# Patient Record
Sex: MALE | Race: WHITE | NOT HISPANIC OR LATINO
[De-identification: names, ages, dates, MRNs, and addresses within clinical notes are randomized per-mention and may not be internally consistent; named-entity substitution may affect disease eponyms.]

---

## 2018-11-01 ENCOUNTER — TRANSCRIPTION ENCOUNTER (OUTPATIENT)
Age: 73
End: 2018-11-01

## 2019-04-22 ENCOUNTER — TRANSCRIPTION ENCOUNTER (OUTPATIENT)
Age: 74
End: 2019-04-22

## 2019-05-08 ENCOUNTER — TRANSCRIPTION ENCOUNTER (OUTPATIENT)
Age: 74
End: 2019-05-08

## 2019-05-08 ENCOUNTER — OUTPATIENT (OUTPATIENT)
Dept: OUTPATIENT SERVICES | Facility: HOSPITAL | Age: 74
LOS: 1 days | End: 2019-05-08

## 2019-05-08 ENCOUNTER — EMERGENCY (EMERGENCY)
Facility: HOSPITAL | Age: 74
LOS: 1 days | End: 2019-05-08
Payer: MEDICARE

## 2019-05-08 PROCEDURE — 99285 EMERGENCY DEPT VISIT HI MDM: CPT

## 2019-05-08 PROCEDURE — 71045 X-RAY EXAM CHEST 1 VIEW: CPT | Mod: 26

## 2019-05-08 PROCEDURE — 93010 ELECTROCARDIOGRAM REPORT: CPT

## 2019-05-09 ENCOUNTER — OUTPATIENT (OUTPATIENT)
Dept: OUTPATIENT SERVICES | Facility: HOSPITAL | Age: 74
LOS: 1 days | End: 2019-05-09

## 2019-05-09 PROCEDURE — 93010 ELECTROCARDIOGRAM REPORT: CPT | Mod: 76

## 2019-05-11 ENCOUNTER — INPATIENT (INPATIENT)
Facility: HOSPITAL | Age: 74
LOS: 3 days | Discharge: HOME | DRG: 156 | End: 2019-05-15
Attending: HOSPITALIST | Admitting: HOSPITALIST
Payer: MEDICARE

## 2019-05-11 VITALS
SYSTOLIC BLOOD PRESSURE: 167 MMHG | RESPIRATION RATE: 18 BRPM | DIASTOLIC BLOOD PRESSURE: 89 MMHG | TEMPERATURE: 98 F | HEART RATE: 52 BPM | OXYGEN SATURATION: 96 %

## 2019-05-11 DIAGNOSIS — Z98.890 OTHER SPECIFIED POSTPROCEDURAL STATES: Chronic | ICD-10-CM

## 2019-05-11 DIAGNOSIS — I10 ESSENTIAL (PRIMARY) HYPERTENSION: ICD-10-CM

## 2019-05-11 DIAGNOSIS — Z29.9 ENCOUNTER FOR PROPHYLACTIC MEASURES, UNSPECIFIED: ICD-10-CM

## 2019-05-11 DIAGNOSIS — Z90.49 ACQUIRED ABSENCE OF OTHER SPECIFIED PARTS OF DIGESTIVE TRACT: Chronic | ICD-10-CM

## 2019-05-11 DIAGNOSIS — I49.9 CARDIAC ARRHYTHMIA, UNSPECIFIED: ICD-10-CM

## 2019-05-11 DIAGNOSIS — N40.0 BENIGN PROSTATIC HYPERPLASIA WITHOUT LOWER URINARY TRACT SYMPTOMS: ICD-10-CM

## 2019-05-11 DIAGNOSIS — I44.0 ATRIOVENTRICULAR BLOCK, FIRST DEGREE: ICD-10-CM

## 2019-05-11 DIAGNOSIS — E78.5 HYPERLIPIDEMIA, UNSPECIFIED: ICD-10-CM

## 2019-05-11 LAB
ALBUMIN SERPL ELPH-MCNC: 4.3 G/DL — SIGNIFICANT CHANGE UP (ref 3.3–5)
ALP SERPL-CCNC: 72 U/L — SIGNIFICANT CHANGE UP (ref 40–120)
ALT FLD-CCNC: 29 U/L — SIGNIFICANT CHANGE UP (ref 10–45)
ANION GAP SERPL CALC-SCNC: 12 MMOL/L — SIGNIFICANT CHANGE UP (ref 5–17)
APPEARANCE UR: CLEAR — SIGNIFICANT CHANGE UP
AST SERPL-CCNC: 18 U/L — SIGNIFICANT CHANGE UP (ref 10–40)
BASOPHILS # BLD AUTO: 0 K/UL — SIGNIFICANT CHANGE UP (ref 0–0.2)
BASOPHILS NFR BLD AUTO: 0.5 % — SIGNIFICANT CHANGE UP (ref 0–2)
BILIRUB SERPL-MCNC: 0.8 MG/DL — SIGNIFICANT CHANGE UP (ref 0.2–1.2)
BILIRUB UR-MCNC: NEGATIVE — SIGNIFICANT CHANGE UP
BUN SERPL-MCNC: 17 MG/DL — SIGNIFICANT CHANGE UP (ref 7–23)
CALCIUM SERPL-MCNC: 9.6 MG/DL — SIGNIFICANT CHANGE UP (ref 8.4–10.5)
CHLORIDE SERPL-SCNC: 103 MMOL/L — SIGNIFICANT CHANGE UP (ref 96–108)
CO2 SERPL-SCNC: 24 MMOL/L — SIGNIFICANT CHANGE UP (ref 22–31)
COLOR SPEC: SIGNIFICANT CHANGE UP
CREAT SERPL-MCNC: 1.08 MG/DL — SIGNIFICANT CHANGE UP (ref 0.5–1.3)
DIFF PNL FLD: NEGATIVE — SIGNIFICANT CHANGE UP
EOSINOPHIL # BLD AUTO: 0.1 K/UL — SIGNIFICANT CHANGE UP (ref 0–0.5)
EOSINOPHIL NFR BLD AUTO: 1.8 % — SIGNIFICANT CHANGE UP (ref 0–6)
GLUCOSE SERPL-MCNC: 88 MG/DL — SIGNIFICANT CHANGE UP (ref 70–99)
GLUCOSE UR QL: NEGATIVE — SIGNIFICANT CHANGE UP
HBA1C BLD-MCNC: 5.4 % — SIGNIFICANT CHANGE UP (ref 4–5.6)
HCT VFR BLD CALC: 45.8 % — SIGNIFICANT CHANGE UP (ref 39–50)
HGB BLD-MCNC: 16.7 G/DL — SIGNIFICANT CHANGE UP (ref 13–17)
INR BLD: 1.03 RATIO — SIGNIFICANT CHANGE UP (ref 0.88–1.16)
KETONES UR-MCNC: NEGATIVE — SIGNIFICANT CHANGE UP
LEUKOCYTE ESTERASE UR-ACNC: NEGATIVE — SIGNIFICANT CHANGE UP
LYMPHOCYTES # BLD AUTO: 2.9 K/UL — SIGNIFICANT CHANGE UP (ref 1–3.3)
LYMPHOCYTES # BLD AUTO: 37.7 % — SIGNIFICANT CHANGE UP (ref 13–44)
MAGNESIUM SERPL-MCNC: 2.1 MG/DL — SIGNIFICANT CHANGE UP (ref 1.6–2.6)
MCHC RBC-ENTMCNC: 33.8 PG — SIGNIFICANT CHANGE UP (ref 27–34)
MCHC RBC-ENTMCNC: 36.4 GM/DL — HIGH (ref 32–36)
MCV RBC AUTO: 92.8 FL — SIGNIFICANT CHANGE UP (ref 80–100)
MONOCYTES # BLD AUTO: 0.5 K/UL — SIGNIFICANT CHANGE UP (ref 0–0.9)
MONOCYTES NFR BLD AUTO: 6.8 % — SIGNIFICANT CHANGE UP (ref 2–14)
NEUTROPHILS # BLD AUTO: 4.1 K/UL — SIGNIFICANT CHANGE UP (ref 1.8–7.4)
NEUTROPHILS NFR BLD AUTO: 53.3 % — SIGNIFICANT CHANGE UP (ref 43–77)
NITRITE UR-MCNC: NEGATIVE — SIGNIFICANT CHANGE UP
PH UR: 6 — SIGNIFICANT CHANGE UP (ref 5–8)
PHOSPHATE SERPL-MCNC: 2.4 MG/DL — LOW (ref 2.5–4.5)
PLATELET # BLD AUTO: 141 K/UL — LOW (ref 150–400)
POTASSIUM SERPL-MCNC: 3.9 MMOL/L — SIGNIFICANT CHANGE UP (ref 3.5–5.3)
POTASSIUM SERPL-SCNC: 3.9 MMOL/L — SIGNIFICANT CHANGE UP (ref 3.5–5.3)
PROT SERPL-MCNC: 7 G/DL — SIGNIFICANT CHANGE UP (ref 6–8.3)
PROT UR-MCNC: NEGATIVE — SIGNIFICANT CHANGE UP
PROTHROM AB SERPL-ACNC: 11.8 SEC — SIGNIFICANT CHANGE UP (ref 10–12.9)
RBC # BLD: 4.93 M/UL — SIGNIFICANT CHANGE UP (ref 4.2–5.8)
RBC # FLD: 12.3 % — SIGNIFICANT CHANGE UP (ref 10.3–14.5)
SODIUM SERPL-SCNC: 139 MMOL/L — SIGNIFICANT CHANGE UP (ref 135–145)
SP GR SPEC: 1.02 — SIGNIFICANT CHANGE UP (ref 1.01–1.02)
UROBILINOGEN FLD QL: NEGATIVE — SIGNIFICANT CHANGE UP
WBC # BLD: 7.8 K/UL — SIGNIFICANT CHANGE UP (ref 3.8–10.5)
WBC # FLD AUTO: 7.8 K/UL — SIGNIFICANT CHANGE UP (ref 3.8–10.5)

## 2019-05-11 PROCEDURE — 99223 1ST HOSP IP/OBS HIGH 75: CPT | Mod: GC

## 2019-05-11 PROCEDURE — 93010 ELECTROCARDIOGRAM REPORT: CPT

## 2019-05-11 PROCEDURE — 93306 TTE W/DOPPLER COMPLETE: CPT | Mod: 26

## 2019-05-11 PROCEDURE — 99223 1ST HOSP IP/OBS HIGH 75: CPT

## 2019-05-11 RX ORDER — SODIUM,POTASSIUM PHOSPHATES 278-250MG
1 POWDER IN PACKET (EA) ORAL
Refills: 0 | Status: COMPLETED | OUTPATIENT
Start: 2019-05-11 | End: 2019-05-12

## 2019-05-11 RX ORDER — FAMCICLOVIR 500 MG/1
0 TABLET, FILM COATED ORAL
Qty: 21 | Refills: 0 | DISCHARGE

## 2019-05-11 RX ORDER — ENOXAPARIN SODIUM 100 MG/ML
40 INJECTION SUBCUTANEOUS EVERY 24 HOURS
Refills: 0 | Status: DISCONTINUED | OUTPATIENT
Start: 2019-05-11 | End: 2019-05-15

## 2019-05-11 RX ORDER — SODIUM CHLORIDE 9 MG/ML
1000 INJECTION INTRAMUSCULAR; INTRAVENOUS; SUBCUTANEOUS
Refills: 0 | Status: DISCONTINUED | OUTPATIENT
Start: 2019-05-11 | End: 2019-05-13

## 2019-05-11 RX ORDER — LOSARTAN POTASSIUM 100 MG/1
25 TABLET, FILM COATED ORAL DAILY
Refills: 0 | Status: DISCONTINUED | OUTPATIENT
Start: 2019-05-11 | End: 2019-05-14

## 2019-05-11 RX ORDER — TAMSULOSIN HYDROCHLORIDE 0.4 MG/1
0.8 CAPSULE ORAL AT BEDTIME
Refills: 0 | Status: DISCONTINUED | OUTPATIENT
Start: 2019-05-11 | End: 2019-05-12

## 2019-05-11 RX ORDER — POTASSIUM PHOSPHATE, MONOBASIC POTASSIUM PHOSPHATE, DIBASIC 236; 224 MG/ML; MG/ML
15 INJECTION, SOLUTION INTRAVENOUS ONCE
Refills: 0 | Status: COMPLETED | OUTPATIENT
Start: 2019-05-11 | End: 2019-05-11

## 2019-05-11 RX ORDER — LOSARTAN POTASSIUM 100 MG/1
100 TABLET, FILM COATED ORAL DAILY
Refills: 0 | Status: DISCONTINUED | OUTPATIENT
Start: 2019-05-11 | End: 2019-05-11

## 2019-05-11 RX ORDER — SIMVASTATIN 20 MG/1
20 TABLET, FILM COATED ORAL AT BEDTIME
Refills: 0 | Status: DISCONTINUED | OUTPATIENT
Start: 2019-05-11 | End: 2019-05-15

## 2019-05-11 RX ADMIN — SODIUM CHLORIDE 50 MILLILITER(S): 9 INJECTION INTRAMUSCULAR; INTRAVENOUS; SUBCUTANEOUS at 22:06

## 2019-05-11 RX ADMIN — SODIUM CHLORIDE 50 MILLILITER(S): 9 INJECTION INTRAMUSCULAR; INTRAVENOUS; SUBCUTANEOUS at 14:52

## 2019-05-11 RX ADMIN — Medication 1 PACKET(S): at 18:09

## 2019-05-11 RX ADMIN — POTASSIUM PHOSPHATE, MONOBASIC POTASSIUM PHOSPHATE, DIBASIC 62.5 MILLIMOLE(S): 236; 224 INJECTION, SOLUTION INTRAVENOUS at 14:52

## 2019-05-11 RX ADMIN — SIMVASTATIN 20 MILLIGRAM(S): 20 TABLET, FILM COATED ORAL at 22:06

## 2019-05-11 NOTE — H&P ADULT - NSHPPHYSICALEXAM_GEN_ALL_CORE
Vital Signs Last 24 Hrs  T(C): 36.6 (11 May 2019 11:02), Max: 36.6 (11 May 2019 11:02)  T(F): 97.9 (11 May 2019 11:02), Max: 97.9 (11 May 2019 11:02)  HR: 52 (11 May 2019 11:02) (52 - 52)  BP: 167/89 (11 May 2019 11:02) (167/89 - 167/89)  BP(mean): --  RR: 18 (11 May 2019 11:02) (18 - 18)  SpO2: 96% (11 May 2019 11:02) (96% - 96%)    PHYSICAL EXAM:      Constitutional: No acute distress, comfortable  Eyes: PERRL, EOMI, conjunctiva clear  ENMT: Oral pharynx clear  Neck: Supple  Back: normal rom and strength  Respiratory: clear to ausc bilaterally. No wheezing or rhonchi  Cardiovascular: S1 S2 no murmurs, bradycardic rate, normal rhythm.   Gastrointestinal: Soft, nontender, normal bowel sounds  Extremities: No edema  Vascular: Good distal pulses, intact +2  Neurological: No muscle weakness. No change in sensation  Skin: Warm, dry  Lymph Nodes: No cervical or supraclavicular LAD  Psychiatric: AOx3

## 2019-05-11 NOTE — H&P ADULT - HISTORY OF PRESENT ILLNESS
72 yo M pmhx of HTN and HLD, presented to OSH ([facility]) with dizziness, lightheadedness, presyncopal symptoms.  Patient denies anything new in his routine or medications.  He works out everyday, and started to notice feeling dizzy and lightheaded with his work out sessions on May 7th, presented to the ED at [place] on May 8th.  Pt has these symptoms with walking, and occasionally wakes up from sleep panicking and feeling similar symptoms.  Pt was seen by cardiology there.  Per patient, he was told he needed to be transferred to a hospital in the city, however, he chose to come to [facility] instead.  Pt denies any chest pain or SOB.  No trouble with swallowing or neck pain.  Pt denies any diarrhea or N/V.  No blood loss.  has history of hemorrhoids s/p resection.  Pt has no symptoms at rest currently  Recently traveled to [place], [place], [place] and [place] all within the past 6 months. 72 yo M pmhx of HTN and HLD, presented to OSH ([facility]) with dizziness, lightheadedness, presyncopal symptoms.  Patient denies anything new in his routine or medications.  He works out everyday, and started to notice feeling dizzy and lightheaded with his work out sessions on May 7th, presented to the ED at [place] on May 8th.  Pt has these symptoms with walking, and occasionally wakes up from sleep panicking and feeling similar symptoms.  Pt was seen by cardiology there.  Pt had a 4.8 second pause on tele at the OSH.  Per patient, he was told he needed to be transferred to a hospital in the city, however, he chose to come to [facility] instead.  Pt denies any chest pain or SOB.  No trouble with swallowing or neck pain.  Pt denies any diarrhea or N/V.  No blood loss.  has history of hemorrhoids s/p resection.  Pt has no symptoms at rest currently  Recently traveled to [place], [place], [place] and [place] all within the past 6 months.     OSH hospital labs were WNL.  Normal trops, TSH, electrolytes, BMP, and CBC.

## 2019-05-11 NOTE — H&P ADULT - NSHPREVIEWOFSYSTEMS_GEN_ALL_CORE
REVIEW OF SYSTEMS:    CONSTITUTIONAL: No weakness, fevers or chills  EYES/ENT: No visual changes;  No vertigo or throat pain   NECK: No pain or stiffness  RESPIRATORY: No cough, wheezing, hemoptysis; No shortness of breath  CARDIOVASCULAR: No chest pain or palpitations  GASTROINTESTINAL: No abdominal or epigastric pain. No nausea, vomiting, or hematemesis; No diarrhea or constipation. No melena or hematochezia.  GENITOURINARY: No dysuria, frequency or hematuria  NEUROLOGICAL: No numbness or weakness  SKIN: No itching, rashes  MSK: Normal back strength, no weakness/ deformities   HEME/ONC: No bleeding, bruising. No weight loss or lymphadenopathy  ALLERGY: No hives, rash, airway compromise, or recent recurrent infections

## 2019-05-11 NOTE — CONSULT NOTE ADULT - SUBJECTIVE AND OBJECTIVE BOX
Patient seen and evaluated at bedside    Chief Complaint: presyncope    HPI:  72 yo M pmhx of HTN and HLD, REYNA (noncompliant with CPAP) presented to OSH ([facility]) with dizziness, lightheadedness, presyncopal symptoms.  Patient denies anything new in his routine or medications.  He works out everyday, and started to notice feeling dizzy and lightheaded with his work out sessions on Monday, May 6th, presented to the ED at [place] on May 8th. Symptoms started while working out on monday, noted he was doing floor exercises and got up and had symptoms, but thought nothing of it, thought he got up too fast. The next day he was getting symptoms while working out on the ground doing crunches, has not gotten up. Since then, he has been having symptoms with walking, and occasionally wakes up from sleep panicking and feeling similar symptoms.  Pt was seen by cardiology at [facility] and noted to have 4.8 second pause on tele at the OSH at 2 pm (unclear what he was doing.       Pt denies any chest pain or SOB.  No trouble with swallowing or neck pain.  Pt denies any diarrhea or N/V.  No blood loss.  has history of hemorrhoids s/p resection.  Pt has no symptoms at rest currently  Recently traveled to [place], [place], [place] and [place] all within the past 6 months.     OSH hospital labs were WNL.  Normal trops, TSH, electrolytes, BMP, and CBC.     Does note he had an episodes of syncope 5 years ago at [facility] and had full workup done which was negative.      PMHx:   BPH (benign prostatic hyperplasia)  Hyperlipidemia  HTN (hypertension)      PSHx:   S/P hemorrhoidectomy  S/P hernia repair  S/P appendectomy      Allergies:  latex (Rash)  No Known Drug Allergies      Home Meds:  Home Medications:  ALFUZOSIN HCL ER 10 MG TB24:  (11 May 2019 12:00)  AMLODIPINE BESYLATE 5 MG TABS:  (11 May 2019 12:00)  LOSARTAN POTASSIUM 100 MG TABS:  (11 May 2019 12:00)  SIMVASTATIN 20 MG TABS:  (11 May 2019 12:00)    Current Medications:   enoxaparin Injectable 40 milliGRAM(s) SubCutaneous every 24 hours  losartan 25 milliGRAM(s) Oral daily  potassium phosphate / sodium phosphate powder 1 Packet(s) Oral three times a day before meals  simvastatin 20 milliGRAM(s) Oral at bedtime  sodium chloride 0.9%. 1000 milliLiter(s) IV Continuous <Continuous>  tamsulosin 0.8 milliGRAM(s) Oral at bedtime    FAMILY HISTORY:  Family history of stroke or transient ischemic attack in father    Social History:  Smoking History: Denied  Alcohol Use: Denied  Drug Use: Denied    REVIEW OF SYSTEMS:  CONSTITUTIONAL: No weakness, fevers or chills  EYES/ENT: No visual changes;  No dysphagia  NECK: No pain or stiffness  RESPIRATORY: No cough, wheezing, hemoptysis; No shortness of breath  CARDIOVASCULAR:  as per hpi  GASTROINTESTINAL: No abdominal or epigastric pain. No nausea, vomiting, or hematemesis; No diarrhea or constipation. No melena or hematochezia.  BACK: No back pain  GENITOURINARY: No dysuria, frequency or hematuria  NEUROLOGICAL: No numbness or weakness  SKIN: No itching, burning, rashes, or lesions   All other review of systems is negative unless indicated above.    Physical Exam:  T(F): 97.8 (05-11), Max: 97.9 (05-11)  HR: 50 (05-11) (50 - 52)  BP: 136/72 (05-11) (136/72 - 167/89)  RR: 18 (05-11)  SpO2: 96% (05-11)  GENERAL: No acute distress, well-developed  HEAD:  Atraumatic, Normocephalic  ENT: EOMI, PERRLA, conjunctiva and sclera clear, Neck supple, No JVD, moist mucosa  CHEST/LUNG: Clear to auscultation bilaterally; No wheeze, equal breath sounds bilaterally   BACK: No spinal tenderness  HEART: Regular rate and rhythm; No murmurs, rubs, or gallops  ABDOMEN: Soft, Nontender, Nondistended; Bowel sounds present  EXTREMITIES:  No clubbing, cyanosis, or edema  PSYCH: Nl behavior, nl affect  NEUROLOGY: AAOx3, non-focal, cranial nerves intact  SKIN: Normal color, No rashes or lesions  LINES:    Cardiovascular Diagnostic Testing:    ECG: Personally reviewed:  Sinus Vance, ,     Echo: Personally reviewed:  ?Unable to find echo in [facility] [facility]      Labs: Personally reviewed                        16.7   7.8   )-----------( 141      ( 11 May 2019 12:14 )             45.8     05-11    139  |  103  |  17  ----------------------------<  88  3.9   |  24  |  1.08    Ca    9.6      11 May 2019 12:14  Phos  2.4     05-11  Mg     2.1     05-11    TPro  7.0  /  Alb  4.3  /  TBili  0.8  /  DBili  x   /  AST  18  /  ALT  29  /  AlkPhos  72  05-11    PT/INR - ( 11 May 2019 12:14 )   PT: 11.8 sec;   INR: 1.03 ratio         Trops negative at OSH    Tele:   Shows variable heart rate, always sinus from 38-80s. single 2s Sinus pause noted this afternoon 12 pm

## 2019-05-11 NOTE — CONSULT NOTE ADULT - ASSESSMENT
72 yo M pmhx of HTN and HLD, REYNA (noncompliant with CPAP) presented to OSH ([facility]) with dizziness, lightheadedness, presyncopal symptoms.  Appears to be asymptomatic from the pauses, however may be having chronotropic incompetence like symptoms.  Tele with Sinus analia with MS that elongates as his HR slows  Tele from [facility] shows a single dropped QRS resulting in "pause" of 4.8s, no MS changes (Mobitz II) also in afternoon    #Analia/pauses: Single dropped QRS and sinus pauses of around 2-3s appear to be asymptomatic, though his inability to tolerate normal ADLs without LH's indicates possible chronotropic incompetence    -TTE  -Tele  -TSH   -Will consider ischemic eval as etiology of slow/conduction blocks, then possible PPM 74 yo M pmhx of HTN and HLD, REYNA (noncompliant with CPAP) presented to OSH ([facility]) with dizziness, lightheadedness, presyncopal symptoms.  Appears to be asymptomatic from the pauses, however may be having chronotropic incompetence like symptoms.  Tele with Sinus analia with OR that elongates as his HR slows  Tele from [facility] shows a single dropped QRS resulting in "pause" of 4.8s, no OR changes (Mobitz II) also in afternoon    #Analia/pauses: Single dropped QRS and sinus pauses of around 2-3s appear to be asymptomatic, though his inability to tolerate normal ADLs without LH's indicates possible chronotropic incompetence  -TTE  -Tele  -TSH   -Exercise treadmill test for chronotropic incompetence.

## 2019-05-11 NOTE — H&P ADULT - PROBLEM SELECTOR PLAN 1
Pt has first degree on EKG from OSH.  Has pauses on tele.  Has symptoms associated with these events.  Troponins were negative.   Cardiology / EP consulted  Holding AV clayton blocking agents (Home amlodipine)  Continue Tele Monitoring  Monitor electrolytes daily - K, Mg, Ph  Echo ordered

## 2019-05-11 NOTE — PROVIDER CONTACT NOTE (OTHER) - SITUATION
patient had 2.6 sec pause  bradycardia  to 38 sleeping at time . woke patient Pt denies any chest pain v/s stable

## 2019-05-11 NOTE — PROVIDER CONTACT NOTE (OTHER) - ASSESSMENT
Pt in stable condition. Asymptomatic HR-59, BP-131/67, O2-93% RA, 98.1-Temp. Denies chest pain, SOB, dizziness.

## 2019-05-12 DIAGNOSIS — R00.1 BRADYCARDIA, UNSPECIFIED: ICD-10-CM

## 2019-05-12 LAB
ANION GAP SERPL CALC-SCNC: 11 MMOL/L — SIGNIFICANT CHANGE UP (ref 5–17)
BUN SERPL-MCNC: 20 MG/DL — SIGNIFICANT CHANGE UP (ref 7–23)
CALCIUM SERPL-MCNC: 9.3 MG/DL — SIGNIFICANT CHANGE UP (ref 8.4–10.5)
CHLORIDE SERPL-SCNC: 107 MMOL/L — SIGNIFICANT CHANGE UP (ref 96–108)
CHOLEST SERPL-MCNC: 138 MG/DL — SIGNIFICANT CHANGE UP (ref 10–199)
CK MB CFR SERPL CALC: 1.9 NG/ML — SIGNIFICANT CHANGE UP (ref 0–6.7)
CK SERPL-CCNC: 76 U/L — SIGNIFICANT CHANGE UP (ref 30–200)
CO2 SERPL-SCNC: 25 MMOL/L — SIGNIFICANT CHANGE UP (ref 22–31)
CREAT SERPL-MCNC: 1.06 MG/DL — SIGNIFICANT CHANGE UP (ref 0.5–1.3)
GLUCOSE SERPL-MCNC: 98 MG/DL — SIGNIFICANT CHANGE UP (ref 70–99)
HDLC SERPL-MCNC: 37 MG/DL — LOW
LIPID PNL WITH DIRECT LDL SERPL: 80 MG/DL — SIGNIFICANT CHANGE UP
MAGNESIUM SERPL-MCNC: 2.2 MG/DL — SIGNIFICANT CHANGE UP (ref 1.6–2.6)
POTASSIUM SERPL-MCNC: 4 MMOL/L — SIGNIFICANT CHANGE UP (ref 3.5–5.3)
POTASSIUM SERPL-SCNC: 4 MMOL/L — SIGNIFICANT CHANGE UP (ref 3.5–5.3)
SODIUM SERPL-SCNC: 143 MMOL/L — SIGNIFICANT CHANGE UP (ref 135–145)
TOTAL CHOLESTEROL/HDL RATIO MEASUREMENT: 3.7 RATIO — SIGNIFICANT CHANGE UP (ref 3.4–9.6)
TRIGL SERPL-MCNC: 107 MG/DL — SIGNIFICANT CHANGE UP (ref 10–149)
TROPONIN T, HIGH SENSITIVITY RESULT: 11 NG/L — SIGNIFICANT CHANGE UP (ref 0–51)
TSH SERPL-MCNC: 2.75 UIU/ML — SIGNIFICANT CHANGE UP (ref 0.27–4.2)

## 2019-05-12 PROCEDURE — 99233 SBSQ HOSP IP/OBS HIGH 50: CPT

## 2019-05-12 RX ORDER — ALFUZOSIN HYDROCHLORIDE 10 MG/1
10 TABLET, EXTENDED RELEASE ORAL AT BEDTIME
Refills: 0 | Status: DISCONTINUED | OUTPATIENT
Start: 2019-05-12 | End: 2019-05-14

## 2019-05-12 RX ADMIN — LOSARTAN POTASSIUM 25 MILLIGRAM(S): 100 TABLET, FILM COATED ORAL at 05:28

## 2019-05-12 RX ADMIN — Medication 1 PACKET(S): at 12:44

## 2019-05-12 RX ADMIN — Medication 1 PACKET(S): at 09:06

## 2019-05-12 RX ADMIN — SIMVASTATIN 20 MILLIGRAM(S): 20 TABLET, FILM COATED ORAL at 21:29

## 2019-05-12 RX ADMIN — ENOXAPARIN SODIUM 40 MILLIGRAM(S): 100 INJECTION SUBCUTANEOUS at 05:38

## 2019-05-12 RX ADMIN — ALFUZOSIN HYDROCHLORIDE 10 MILLIGRAM(S): 10 TABLET, EXTENDED RELEASE ORAL at 22:32

## 2019-05-12 NOTE — PROVIDER CONTACT NOTE (OTHER) - ASSESSMENT
Pt reports feeling lightheaded. Denies chest pain, sob Pt reports feeling lightheaded. Denies chest pain, sob. Bp- 135/84, HR 44

## 2019-05-13 LAB
ANION GAP SERPL CALC-SCNC: 11 MMOL/L — SIGNIFICANT CHANGE UP (ref 5–17)
B BURGDOR C6 AB SER-ACNC: NEGATIVE — SIGNIFICANT CHANGE UP
B BURGDOR IGG+IGM SER-ACNC: 0.08 INDEX — SIGNIFICANT CHANGE UP (ref 0.01–0.89)
BUN SERPL-MCNC: 21 MG/DL — SIGNIFICANT CHANGE UP (ref 7–23)
CALCIUM SERPL-MCNC: 9.4 MG/DL — SIGNIFICANT CHANGE UP (ref 8.4–10.5)
CHLORIDE SERPL-SCNC: 105 MMOL/L — SIGNIFICANT CHANGE UP (ref 96–108)
CO2 SERPL-SCNC: 21 MMOL/L — LOW (ref 22–31)
CREAT SERPL-MCNC: 1.09 MG/DL — SIGNIFICANT CHANGE UP (ref 0.5–1.3)
GLUCOSE SERPL-MCNC: 90 MG/DL — SIGNIFICANT CHANGE UP (ref 70–99)
MAGNESIUM SERPL-MCNC: 2 MG/DL — SIGNIFICANT CHANGE UP (ref 1.6–2.6)
PHOSPHATE SERPL-MCNC: 3.1 MG/DL — SIGNIFICANT CHANGE UP (ref 2.5–4.5)
POTASSIUM SERPL-MCNC: 3.9 MMOL/L — SIGNIFICANT CHANGE UP (ref 3.5–5.3)
POTASSIUM SERPL-SCNC: 3.9 MMOL/L — SIGNIFICANT CHANGE UP (ref 3.5–5.3)
SODIUM SERPL-SCNC: 137 MMOL/L — SIGNIFICANT CHANGE UP (ref 135–145)

## 2019-05-13 PROCEDURE — 99231 SBSQ HOSP IP/OBS SF/LOW 25: CPT

## 2019-05-13 PROCEDURE — 99233 SBSQ HOSP IP/OBS HIGH 50: CPT

## 2019-05-13 RX ADMIN — LOSARTAN POTASSIUM 25 MILLIGRAM(S): 100 TABLET, FILM COATED ORAL at 05:09

## 2019-05-13 RX ADMIN — SIMVASTATIN 20 MILLIGRAM(S): 20 TABLET, FILM COATED ORAL at 21:33

## 2019-05-13 RX ADMIN — ALFUZOSIN HYDROCHLORIDE 10 MILLIGRAM(S): 10 TABLET, EXTENDED RELEASE ORAL at 21:33

## 2019-05-13 RX ADMIN — ENOXAPARIN SODIUM 40 MILLIGRAM(S): 100 INJECTION SUBCUTANEOUS at 05:09

## 2019-05-13 NOTE — PROGRESS NOTE ADULT - ASSESSMENT
74 y/o man w/ PMH HTN, BPH, and HLD tx from [facility] w/ bradycardia w/ sinus pauses.

## 2019-05-13 NOTE — CHART NOTE - NSCHARTNOTEFT_GEN_A_CORE
Informed by Tele Tech that patient had a 3.1 second pause on the monitor.  While evaluating patient, informed that HR was 30's - possibly 2nd degree type II or 3rd degree AV block.  Patient denies associated symptoms and is in no acute distress.  R2 pads applied to chest wall - HR now 40's to 50's as before.  EP notified and requested cardiology consult - [facility] called.  Will cancel stress test at this time as patient can not go off tele today.  Dr. Bobby informed of preceding.    Amanda Mills NP  [phone]

## 2019-05-13 NOTE — PROVIDER CONTACT NOTE (OTHER) - ASSESSMENT
Patient is A&OX4. Asymptomatic. T-97.6 P-51 R- 16 Bp 129/76 O2-95% on room air. NP at bedside at this time

## 2019-05-13 NOTE — PROGRESS NOTE ADULT - ASSESSMENT
74 yo M pmhx of HTN and HLD, REYNA (noncompliant with CPAP) presented to OSH ([facility]) with dizziness, lightheadedness, presyncopal symptoms. Appears to be asymptomatic from the pauses, however may be having chronotropic incompetence like symptoms. Tele with Sinus analia with WV that elongates as his HR slows. Tele from [facility] shows a single dropped QRS resulting in "pause" of 4.8s, no WV changes (Mobitz II) also in afternoon. Patient admits he had syncope x 2 times several years ago (once while getting up from sitting and another time was walking after 2 blocks) both times without prodromes.     #Analia/pauses: Single dropped QRS and sinus pauses of around 2-3s appear to be asymptomatic, though his inability to tolerate normal ADLs without LH's indicates possible chronotropic incompetence  -TTE and TSH result noted  -Awaiting Exercise treadmill test for chronotropic incompetence/ ischemia w/u   -Further EP recommendation pending above     [id number] 72 yo M pmhx of HTN and HLD, REYNA (noncompliant with CPAP) presented to OSH ([facility]) with dizziness, lightheadedness, presyncopal symptoms. Appears to be asymptomatic from the pauses, however may be having chronotropic incompetence like symptoms. Tele with Sinus analia with DE that elongates as his HR slows. Tele from [facility] shows a single QRS dropped after P-P prolongation which is c/w high vago tone.      #Analia/pauses:   -Tele strips reviewed with EP attending, pauses c/w high vago tone as P-P prolongs prior to dropped beat   -Would enforce use of CPAP during sleep   -TTE and TSH result noted  -Ambulate patient to check for chronotropic incompetence   -Hold off on exercise stress test   -Likely discharge home with 2 weeks real time even monitor (Patch)  -Pacemaker not indicated at this time     [id number]

## 2019-05-14 ENCOUNTER — TRANSCRIPTION ENCOUNTER (OUTPATIENT)
Age: 74
End: 2019-05-14

## 2019-05-14 DIAGNOSIS — G47.33 OBSTRUCTIVE SLEEP APNEA (ADULT) (PEDIATRIC): ICD-10-CM

## 2019-05-14 DIAGNOSIS — I95.1 ORTHOSTATIC HYPOTENSION: ICD-10-CM

## 2019-05-14 PROCEDURE — 99232 SBSQ HOSP IP/OBS MODERATE 35: CPT

## 2019-05-14 PROCEDURE — 99233 SBSQ HOSP IP/OBS HIGH 50: CPT

## 2019-05-14 RX ORDER — SODIUM CHLORIDE 9 MG/ML
500 INJECTION INTRAMUSCULAR; INTRAVENOUS; SUBCUTANEOUS ONCE
Refills: 0 | Status: COMPLETED | OUTPATIENT
Start: 2019-05-14 | End: 2019-05-14

## 2019-05-14 RX ORDER — LOSARTAN POTASSIUM 100 MG/1
0 TABLET, FILM COATED ORAL
Qty: 90 | Refills: 0 | DISCHARGE

## 2019-05-14 RX ORDER — AMLODIPINE BESYLATE 2.5 MG/1
0 TABLET ORAL
Qty: 90 | Refills: 0 | DISCHARGE

## 2019-05-14 RX ORDER — ALFUZOSIN HYDROCHLORIDE 10 MG/1
0 TABLET, EXTENDED RELEASE ORAL
Qty: 90 | Refills: 0 | DISCHARGE

## 2019-05-14 RX ADMIN — ENOXAPARIN SODIUM 40 MILLIGRAM(S): 100 INJECTION SUBCUTANEOUS at 05:40

## 2019-05-14 RX ADMIN — SODIUM CHLORIDE 250 MILLILITER(S): 9 INJECTION INTRAMUSCULAR; INTRAVENOUS; SUBCUTANEOUS at 12:42

## 2019-05-14 RX ADMIN — LOSARTAN POTASSIUM 25 MILLIGRAM(S): 100 TABLET, FILM COATED ORAL at 05:40

## 2019-05-14 RX ADMIN — SIMVASTATIN 20 MILLIGRAM(S): 20 TABLET, FILM COATED ORAL at 21:34

## 2019-05-14 NOTE — PROVIDER CONTACT NOTE (OTHER) - DATE AND TIME:
11-May-2019 12:40
11-May-2019 21:06
12-May-2019 06:00
13-May-2019 12:45
14-May-2019 00:50
14-May-2019 12:00
14-May-2019 23:35

## 2019-05-14 NOTE — PROVIDER CONTACT NOTE (OTHER) - RECOMMENDATIONS
IVF?
Notify NP
Notify NP
R2 pads and Atropine at bedside.
R2 pads and atropine at bedside
TANNER Brooks to assess patient and review chart?

## 2019-05-14 NOTE — PROVIDER CONTACT NOTE (OTHER) - ASSESSMENT
Patient is A&OX4. Asymptomatic. T-97.6 P-47 R- 18 Bp 131/75 O2-93% on room air. Pt non compliant with CPAP at this time.

## 2019-05-14 NOTE — DISCHARGE NOTE PROVIDER - NSDCCAREPROVSEEN_GEN_ALL_CORE_FT
Solomon, Philip N Khan, Mohsinuzzaman  [facility] Cardiac Electrophysiology, Team  [facility] Medicine, Advance PracticeTeam

## 2019-05-14 NOTE — PROVIDER CONTACT NOTE (OTHER) - ACTION/TREATMENT ORDERED:
r2 pads and atropine at bedside
TANNER Brooks aware. Will continue to monitor.
500mL NS to be ordered. Continue to monitor and maintain safety.
NP at bedside during time. R2 pads on. Atropine at the bedside. Will continue to monitor
No orders at tis time. Continue to monitor.
Pt encouraged to use CPAP. R2 pads on. Atropine at the bedside. Will continue to monitor.
Will order cardiac enzymes

## 2019-05-14 NOTE — PHYSICAL THERAPY INITIAL EVALUATION ADULT - ADDITIONAL COMMENTS
Pt lives with his friend in a house with 2-3 steps to enter, +HR and 12 steps with landing in between inside, +HR. Pt also lives in a apt but will be staying in the house post discharge. Pt is independent with all ADLs and ambulation. Pt does not use a AD for ambulation. Pt's friend available to assist when needed. +trifocals. +drives.

## 2019-05-14 NOTE — DISCHARGE NOTE PROVIDER - CARE PROVIDER_API CALL
Codey Dye  Phone: [phone]  Fax: (   )    -  Follow Up Time: 2 weeks Dr. Derrell Amaya, Primary Care Doctor  Phone: [phone]  Fax: (   )    -  Follow Up Time: 1 week

## 2019-05-14 NOTE — PROGRESS NOTE ADULT - ASSESSMENT
72 y/o man w/ PMH HTN, BPH, REYNA, and HLD tx from [facility] w/ bradycardia w/ sinus pauses and orthostatic hypotension.

## 2019-05-14 NOTE — PHYSICAL THERAPY INITIAL EVALUATION ADULT - PERTINENT HX OF CURRENT PROBLEM, REHAB EVAL
Pt is a 73 y.o. male with pmhx of HTN and HLD, REYNA (noncompliant with CPAP) presented to OSH ([facility]) with dizziness, lightheadedness, presyncopal symptoms.Appears to be asymptomatic from the pauses, however may be having chronotropic incompetence like symptoms. (-) TTE 5/11/19.

## 2019-05-14 NOTE — DISCHARGE NOTE PROVIDER - NSDCCPCAREPLAN_GEN_ALL_CORE_FT
PRINCIPAL DISCHARGE DIAGNOSIS  Diagnosis: Symptomatic bradycardia  Assessment and Plan of Treatment: Symptomatic bradycardia PRINCIPAL DISCHARGE DIAGNOSIS  Diagnosis: Symptomatic bradycardia  Assessment and Plan of Treatment: Your heart rate is borderline low. This may be due to your sleep apnea. It is essential that you wear your CPAP machine on a nightly basis. You will be discharged with a "patch" that will monitor your heart rate over the next couple of weeks. You will be contacted if there is any evidence of abnormal rhythms.   Please follow up with your primary care doctor.      SECONDARY DISCHARGE DIAGNOSES  Diagnosis: Orthostatic hypotension  Assessment and Plan of Treatment: Your blood pressure dropped when moving from a seated to standing position. This may be due to your blood pressure and prostate medications.  Do not take these medications after discharge until you follow up with your primary care doctor and urologist.

## 2019-05-14 NOTE — DISCHARGE NOTE PROVIDER - PROVIDER TOKENS
FREE:[LAST:[Hardik],FIRST:[Codey],PHONE:[phone]],FAX:[(   )    -],FOLLOWUP:[2 weeks]] FREE:[LAST:[Dr. Derrell Amaya],FIRST:[Primary Care Doctor],PHONE:[phone]],FAX:[(   )    -],FOLLOWUP:[1 week]]

## 2019-05-14 NOTE — PROVIDER CONTACT NOTE (OTHER) - ASSESSMENT
Patient is A&ox4 denies any pain/ discomfort or SOB. VVS, Temp: 97.9F, BP: 132/77, HR: 53, O2 95% ORA. Patient was asleep at time of ectopy.

## 2019-05-14 NOTE — DISCHARGE NOTE PROVIDER - HOSPITAL COURSE
72 yo M pmhx of HTN and HLD, presented to OSH ([facility]) with dizziness, lightheadedness, presyncopal symptoms.  Patient denies anything new in his routine or medications.  He works out everyday, and started to notice feeling dizzy and lightheaded with his work out sessions on May 7th, presented to the ED at [place] on May 8th.  Pt has these symptoms with walking, and occasionally wakes up from sleep panicking and feeling similar symptoms.  Pt was seen by cardiology there.  Pt had a 4.8 second pause on tele at the OSH.  Per patient, he was told he needed to be transferred to a hospital in the city, however, he chose to come to [facility] instead.  Pt denies any chest pain or SOB.  No trouble with swallowing or neck pain.  Pt denies any diarrhea or N/V.  No blood loss. He has history of hemorrhoids s/p resection.  Pt has no symptoms at rest currently. Recently traveled to [place], [place], [place] and [place] all within the past 6 months. OSH hospital labs were WNL.  Normal trops, TSH, electrolytes, BMP, and CBC. PT sen by EP. Vance/pauses in the setting of non-compliant with CPAP. Tele with episodes of asymptomatic vasovagal mediated pauses (P-P prolongation) during sleep hour, pt was not wearing his CPAP at the time. Encourage use of CPAP. Pacemaker not indicated at this time. Pt will need 2 weeks real time even monitor (Patch) upon discharge. Clear from EP perspective for discharge planning. Pt with +orthostatic hypotension. D/C all HTN medication. Pt to get discharge home on simvastatin and follow up with out patient cardiologist about restaring HTN meds if needed. S/P 500 cc Bolus. Pt ................ 72 y/o M pmhx of HTN, REYNA, BPH and HLD, presented to OSH ([facility]) with dizziness, lightheadedness, presyncopal symptoms. Patient was found to have bradycardia with sinus pauses and was transferred to [facility] for further management. Patient evaluated by EP at [facility]. TTE completed that showed no evidence of decreased EF or valvular pathology. Patient had nocturnal 3 second pauses on telemetry and bradycardia while sleeping ~40s-50s. Not a candidate for stress test at this time and further ischemic workup deferred. Lyme titers negative. As per EP, suspect patient w/ vasovagal pauses and bradycardia largely in the setting of his REYNA (and CPAP noncompliance). No evidence of heart block. Patient not indicated for PPM at this time. Plan for outpt monitoring x 2-3 weeks w/ patch (placed on day of d/c).        Patient's course c/b continued intermittent lightheadedness. He was found to be orthostatic. His home blood pressure medications and his alfuzosin were discontinued. 74 y/o M pmhx of HTN, REYNA, BPH and HLD, presented to OSH ([facility]) with dizziness, lightheadedness, presyncopal symptoms. Patient was found to have bradycardia with sinus pauses and was transferred to [facility] for further management. Patient evaluated by EP at [facility]. TTE completed that showed no evidence of decreased EF or valvular pathology. Patient had nocturnal 3 second pauses on telemetry and bradycardia while sleeping ~40s-50s. Not a candidate for stress test at this time and further ischemic workup deferred. Lyme titers negative. As per EP, suspect patient w/ vasovagal pauses and bradycardia largely in the setting of his REYNA (and CPAP noncompliance). No evidence of heart block. Patient not indicated for PPM at this time.  Plan for outpt monitoring x 2-3 weeks w/ patch which was placed today.  Patient's course c/b continued intermittent lightheadedness. He was found to be orthostatic.  Orthostasis resolved with small IV fluid bolus and discontinuation of his blood pressure medications and alfazosin.  Patient was cleared by EP and IM for discharge and will follow up with his Primary Care Doctor within 1 week.  Per EP, if any events are noted on the event monitor, they will contact patient, otherwise no need for EP follow up.

## 2019-05-14 NOTE — PHYSICAL THERAPY INITIAL EVALUATION ADULT - GAIT TRAINING, PT EVAL
GOAL: Patient will ambulate 300 feet with appropriate assistive device as needed, independent, in 2 weeks.

## 2019-05-14 NOTE — PROGRESS NOTE ADULT - ASSESSMENT
74 yo M pmhx of HTN and HLD, REYNA (noncompliant with CPAP) presented to OSH ([facility]) with dizziness, lightheadedness, presyncopal symptoms, found to have periods of 2nd degree type I AVB (Wenckebach) and vasovagal pauses during sleep hours.    #Vance/pauses in the setting of non-compliant with CPAP  -Tele: Overnight had two episodes of asymptomatic vasovagal mediated pauses (P-P prolongation) during sleep hour, pt was not wearing his CPAP at the time.   -Unknown etiology of dizziness as pt had another episode this morning while in the bathroom but no changes in heart rate or rhythm noted on telemetry  -Encourage use of CPAP   -Pacemaker not indicated at this time   -Pt will need 2 weeks real time even monitor (Patch) upon discharge (please call tech at [phone] when confirmed discharge)  -Clear from EP perspective for discharge planning  -Discussed with pt's PCP on the phone  -Plan discussed with primary team    [id number]

## 2019-05-14 NOTE — PHYSICAL THERAPY INITIAL EVALUATION ADULT - PLANNED THERAPY INTERVENTIONS, PT EVAL
balance training/gait training/strengthening/GOAL: Stair Negotiation Training: Patient will be able to negotiate up & down 1 flight of stairs with unilateral rail, step through gait pattern, in 2 weeks.

## 2019-05-14 NOTE — PROVIDER CONTACT NOTE (OTHER) - ASSESSMENT
Pt. AOX4. C/O of feeling "lightheaded" upon standing during orthostatics. laying 128/83, 55p. sitting 119/75, 60p. standing 90/61, 73p.

## 2019-05-15 ENCOUNTER — TRANSCRIPTION ENCOUNTER (OUTPATIENT)
Age: 74
End: 2019-05-15

## 2019-05-15 VITALS
OXYGEN SATURATION: 99 % | HEART RATE: 72 BPM | SYSTOLIC BLOOD PRESSURE: 140 MMHG | RESPIRATION RATE: 18 BRPM | TEMPERATURE: 97 F | DIASTOLIC BLOOD PRESSURE: 80 MMHG

## 2019-05-15 PROCEDURE — 86618 LYME DISEASE ANTIBODY: CPT

## 2019-05-15 PROCEDURE — 80053 COMPREHEN METABOLIC PANEL: CPT

## 2019-05-15 PROCEDURE — 97162 PT EVAL MOD COMPLEX 30 MIN: CPT

## 2019-05-15 PROCEDURE — 83735 ASSAY OF MAGNESIUM: CPT

## 2019-05-15 PROCEDURE — 84484 ASSAY OF TROPONIN QUANT: CPT

## 2019-05-15 PROCEDURE — 80061 LIPID PANEL: CPT

## 2019-05-15 PROCEDURE — 84443 ASSAY THYROID STIM HORMONE: CPT

## 2019-05-15 PROCEDURE — 80048 BASIC METABOLIC PNL TOTAL CA: CPT

## 2019-05-15 PROCEDURE — 99239 HOSP IP/OBS DSCHRG MGMT >30: CPT

## 2019-05-15 PROCEDURE — 84100 ASSAY OF PHOSPHORUS: CPT

## 2019-05-15 PROCEDURE — 94660 CPAP INITIATION&MGMT: CPT

## 2019-05-15 PROCEDURE — 81003 URINALYSIS AUTO W/O SCOPE: CPT

## 2019-05-15 PROCEDURE — 82550 ASSAY OF CK (CPK): CPT

## 2019-05-15 PROCEDURE — 93005 ELECTROCARDIOGRAM TRACING: CPT

## 2019-05-15 PROCEDURE — 85610 PROTHROMBIN TIME: CPT

## 2019-05-15 PROCEDURE — 86803 HEPATITIS C AB TEST: CPT

## 2019-05-15 PROCEDURE — 85027 COMPLETE CBC AUTOMATED: CPT

## 2019-05-15 PROCEDURE — 82553 CREATINE MB FRACTION: CPT

## 2019-05-15 PROCEDURE — 83036 HEMOGLOBIN GLYCOSYLATED A1C: CPT

## 2019-05-15 PROCEDURE — 93306 TTE W/DOPPLER COMPLETE: CPT

## 2019-05-15 RX ADMIN — ENOXAPARIN SODIUM 40 MILLIGRAM(S): 100 INJECTION SUBCUTANEOUS at 05:52

## 2019-05-15 NOTE — PROGRESS NOTE ADULT - PROBLEM SELECTOR PLAN 5
Lovenox for DVT prophylaxis.   Pt will need PT consult prior to discharge
Holding alfuzosin (had been prescribed by a urologist). Has no h/o urinary retention.  - Patient refused finasteride (previously did not tolerate SE of decreased libido)  - outpt urology f/u
Holding alfuzosin (had been prescribed by a urologist). Has no h/o urinary retention.  - Patient refused finasteride (previously did not tolerate SE of decreased libido)  - outpt urology f/u
DVT prophylaxis: c/w lovenox

## 2019-05-15 NOTE — PROGRESS NOTE ADULT - ATTENDING COMMENTS
Plan d/w patient, EP (TANNER Mi), and medicine NP (Amanda). Again reached out to PCP (Dr. Codey Dye [phone]) with no response (he was made aware of hospitalization by EP team yesterday). Patient is stable for D/C home this afternoon.    Discharge planning time 39 minutes.     Aamir Bobby M.D.  Hospitalist  Pager: [phone]
Plan d/w patient, EP, and medicine NP (Amanda).    Aamir Bobby M.D.  Hospitalist  Pager: [phone]
Plan d/w patient, EP (Dr. Guillen/NP [place]), and medicine NP (Misha). Reached out to PCP (Dr. Codey Dye [phone]) with no response. Will reattempt (EP NP did d/w him this AM).     Aamir Bobby M.D.  Hospitalist  Pager: [phone]

## 2019-05-15 NOTE — PROGRESS NOTE ADULT - PROBLEM SELECTOR PLAN 7

## 2019-05-15 NOTE — PROGRESS NOTE ADULT - PROBLEM SELECTOR PLAN 3
As above.  - Holding meds as above
As above, symptomatic orthostasis.  - Holding meds as above
Continue Simvastatin
Continue Simvastatin

## 2019-05-15 NOTE — PROGRESS NOTE ADULT - ASSESSMENT
72 y/o man w/ PMH HTN, BPH, REYNA, and HLD tx from [facility] w/ bradycardia w/ sinus pauses and orthostatic hypotension.

## 2019-05-15 NOTE — PROGRESS NOTE ADULT - PROBLEM SELECTOR PLAN 4
Counseled on CPAP compliance. Patient reports he was recently refitted for a mask, which he feels is comfortable. Non-compliance w/ his CPAP could be reasonable etiology for his nocturnal bradycardia/pauses.
Counseled on CPAP compliance. Patient reports he was recently refitted for a mask, which he feels is comfortable. Non-compliance w/ his CPAP could be reasonable etiology for his nocturnal bradycardia/pauses.
c/w home Alfuzosin
Pt takes Alfuzosin at home  Converted to Tamsulosin here

## 2019-05-15 NOTE — PROGRESS NOTE ADULT - PROBLEM SELECTOR PROBLEM 5
BPH (benign prostatic hyperplasia)
BPH (benign prostatic hyperplasia)
Need for prophylactic measure
Need for prophylactic measure

## 2019-05-15 NOTE — PROGRESS NOTE ADULT - SUBJECTIVE AND OBJECTIVE BOX
Patient is a 73y old  Male who presents with a chief complaint of Bradycardia    SUBJECTIVE / OVERNIGHT EVENTS: Patient seen and examined. He ambulated with PT today w/ no risk of fall. He still felt lightheaded when moving from seated to standing position. Denies chest pain and SOB. No LE edema or swelling.     12 point ROS otherwise negative     MEDICATIONS  (STANDING):  enoxaparin Injectable 40 milliGRAM(s) SubCutaneous every 24 hours  simvastatin 20 milliGRAM(s) Oral at bedtime    MEDICATIONS  (PRN):      I&O's Summary    13 May 2019 07:01  -  14 May 2019 07:00  --------------------------------------------------------  IN: 1160 mL / OUT: 970 mL / NET: 190 mL    14 May 2019 07:01  -  14 May 2019 13:02  --------------------------------------------------------  IN: 120 mL / OUT: 0 mL / NET: 120 mL    Vital Signs Last 24 Hrs  T(C): 36.6 (14 May 2019 12:35), Max: 36.8 (13 May 2019 21:11)  T(F): 97.9 (14 May 2019 12:35), Max: 98.2 (13 May 2019 21:11)  HR: 50 (14 May 2019 12:35) (47 - 62)  BP: 132/79 (14 May 2019 12:35) (124/71 - 161/84)  BP(mean): --  RR: 18 (14 May 2019 12:35) (18 - 18)  SpO2: 95% (14 May 2019 12:35) (93% - 96%)    PHYSICAL EXAM:  GENERAL: NAD, well-developed  EYES: EOMI, PERRLA, conjunctiva and sclera clear  NECK: Supple, No JVD, no thyromegaly  CHEST/LUNG: Clear to auscultation bilaterally; No wheeze  HEART: bradycardic; S1, S2 audible, No murmurs, rubs, or gallops  ABDOMEN: Soft, Nontender, Nondistended; Bowel sounds present  EXTREMITIES:  2+ Peripheral Pulses, No clubbing, cyanosis, or edema  NEURO: AAOx3, no focal deficit      LABS:        05-13    137  |  105  |  21  ----------------------------<  90  3.9   |  21<L>  |  1.09    Ca    9.4      13 May 2019 06:39  Phos  3.1     05-13  Mg     2.0     05-13    Lyme Ab negative    RADIOLOGY & ADDITIONAL TESTS:    TELE: Sinus bradycardia 40s-70s w/ two 3 second pauses @12:40 and 1 AM. No evidence of 3rd degree heart block. +P-P variation ?related to increased vagal tone  Imaging Personally Reviewed:   Consultant(s) Notes Reviewed: Card (EP)  Care Discussed with Consultants/Other Providers: EP
24H hour events:   Pt c/o dizziness this morning while in the bathroom, no changes noted on telemetry at the time  Overnight pt had 2 episodes of vasovagal pause during sleep hour (3.128 and 3.28 sec), pt was not wearing his CPAP at the time     MEDICATIONS:  alfuzosin 10 milliGRAM(s) Oral at bedtime  enoxaparin Injectable 40 milliGRAM(s) SubCutaneous every 24 hours  losartan 25 milliGRAM(s) Oral daily  simvastatin 20 milliGRAM(s) Oral at bedtime      REVIEW OF SYSTEMS:  Complete 10point ROS negative.    PHYSICAL EXAM:  T(C): 36.7 (05-14-19 @ 04:38), Max: 36.8 (05-13-19 @ 21:11)  HR: 53 (05-14-19 @ 05:41) (47 - 62)  BP: 124/71 (05-14-19 @ 04:38) (124/71 - 161/84)  RR: 18 (05-14-19 @ 04:38) (16 - 18)  SpO2: 94% (05-14-19 @ 05:41) (93% - 96%)  I&O's Summary    13 May 2019 07:01  -  14 May 2019 07:00  --------------------------------------------------------  IN: 1160 mL / OUT: 970 mL / NET: 190 mL      Appearance: NAD  HEENT:   Normal oral mucosa, PERRL, EOMI	  Lymphatic: No lymphadenopathy  Cardiovascular: Normal S1 S2, No JVD, No murmurs, No edema  Respiratory: Lungs clear to auscultation	  Psychiatry: A & O x 3, Mood & affect appropriate  Gastrointestinal:  Soft, Non-tender, + BS	  Skin: No rashes, No ecchymoses, No cyanosis	  Neurologic: Non-focal  Extremities: Normal range of motion, No clubbing, cyanosis or edema  Vascular: Peripheral pulses palpable 2+ bilaterally      LABS:	 	      05-13    137  |  105  |  21  ----------------------------<  90  3.9   |  21<L>  |  1.09    Ca    9.4      13 May 2019 06:39  Phos  3.1     05-13  Mg     2.0     05-13      TELEMETRY: SB and SR at 40-60's. Overnight pt had 2 episodes of vasovagal pause during sleep hour (3.128 and 3.28 sec)   	    < from: Transthoracic Echocardiogram (05.11.19 @ 14:59) >  Dimensions:    Normal Values:  LA:     4.4    2.0 - 4.0 cm  Ao:     3.7    2.0 - 3.8 cm  SEPTUM: 1.2    0.6 - 1.2 cm  PWT:    1.0    0.6 - 1.1 cm  LVIDd:  4.9    3.0 - 5.6 cm  LVIDs:  3.0    1.8 - 4.0 cm  Derived variables:  LVMI: 103 g/m2  RWT: 0.40  EF (Visual Estimate): 65 %  ------------------------------------------------------------------------  Observations:  Mitral Valve: Normal mitral valve.  Aortic Valve/Aorta: Normal appearing aortic valve leaflets.   Minimal aortic regurgitation directed towards the anterior  mitral leaflet.  Normal aortic root size. (Ao: 3.7 cm at the sinuses of  Valsalva).  Left Atrium: Normal left atrium.  LA volume index = 19  cc/m2.  Left Ventricle: Normal left ventricular systolic function.  No segmental wall motion abnormalities. Normal left  ventricular internal dimensions and wall thicknesses.  Normal diastolic function  Right Heart: Normal right atrium. Normal right ventricular  size and function. Normal tricuspid valve. Normal pulmonic  valve. Minimal pulmonic regurgitation.  Pericardium/Pleura: Normal pericardium with no pericardial  effusion.  Hemodynamic: EstimatedRA pressure is normal.  Pulmonary artery pressure is normal.  No PFO seen with color Doppler.  ------------------------------------------------------------------------  Conclusions:  Normal left ventricular systolic function. No segmental  wall motion abnormalities.
24H hour events: Pt without complaints. Tele: SB/SR 44-60's, no further pauses since 5/11    MEDICATIONS:  alfuzosin 10 milliGRAM(s) Oral at bedtime  enoxaparin Injectable 40 milliGRAM(s) SubCutaneous every 24 hours  losartan 25 milliGRAM(s) Oral daily    simvastatin 20 milliGRAM(s) Oral at bedtime    sodium chloride 0.9%. 1000 milliLiter(s) IV Continuous <Continuous>      REVIEW OF SYSTEMS:  Complete 10point ROS negative.    PHYSICAL EXAM:  T(C): 36.8 (05-13-19 @ 04:02), Max: 37 (05-12-19 @ 13:40)  HR: 48 (05-13-19 @ 06:39) (48 - 62)  BP: 127/73 (05-13-19 @ 04:02) (127/73 - 140/83)  RR: 18 (05-13-19 @ 04:02) (18 - 18)  SpO2: 94% (05-13-19 @ 06:39) (94% - 95%)  I&O's Summary    12 May 2019 07:01  -  13 May 2019 07:00  --------------------------------------------------------  IN: 1310 mL / OUT: 1740 mL / NET: -430 mL      Appearance: NAD, resting comfortably in bed 	  HEENT:   Normal oral mucosa, PERRL, EOMI	  Lymphatic: No lymphadenopathy  Cardiovascular: Normal S1 S2, No JVD, No murmurs, No edema  Respiratory: Lungs clear to auscultation	  Psychiatry: A & O x 3, Mood & affect appropriate  Gastrointestinal:  Soft, Non-tender, + BS	  Skin: No rashes, No ecchymoses, No cyanosis	  Neurologic: Non-focal  Extremities: Normal range of motion, No clubbing, cyanosis or edema  Vascular: Peripheral pulses palpable 2+ bilaterally      LABS:	 	    CBC Full  -  ( 11 May 2019 12:14 )  WBC Count : 7.8 K/uL  Hemoglobin : 16.7 g/dL  Hematocrit : 45.8 %  Platelet Count - Automated : 141 K/uL  Mean Cell Volume : 92.8 fl  Mean Cell Hemoglobin : 33.8 pg  Mean Cell Hemoglobin Concentration : 36.4 gm/dL  Auto Neutrophil # : 4.1 K/uL  Auto Lymphocyte # : 2.9 K/uL  Auto Monocyte # : 0.5 K/uL  Auto Eosinophil # : 0.1 K/uL  Auto Basophil # : 0.0 K/uL  Auto Neutrophil % : 53.3 %  Auto Lymphocyte % : 37.7 %  Auto Monocyte % : 6.8 %  Auto Eosinophil % : 1.8 %  Auto Basophil % : 0.5 %    05-13    137  |  105  |  21  ----------------------------<  90  3.9   |  21<L>  |  1.09  05-12    143  |  107  |  20  ----------------------------<  98  4.0   |  25  |  1.06    Ca    9.4      13 May 2019 06:39  Ca    9.3      12 May 2019 07:34  Phos  3.1     05-13  Phos  2.4     05-11  Mg     2.0     05-13  Mg     2.2     05-12    TPro  7.0  /  Alb  4.3  /  TBili  0.8  /  DBili  x   /  AST  18  /  ALT  29  /  AlkPhos  72  05-11    TSH: 2.75      TELEMETRY: SB/SR 44-60's, no further pauses since 5/11	      ECG: SB,  ms, QRSd 98 ms 	    < from: Transthoracic Echocardiogram (05.11.19 @ 14:59) >  Dimensions:    Normal Values:  LA:     4.4    2.0 - 4.0 cm  Ao:     3.7    2.0 - 3.8 cm  SEPTUM: 1.2    0.6 - 1.2 cm  PWT:    1.0    0.6 - 1.1 cm  LVIDd:  4.9    3.0 - 5.6 cm  LVIDs:  3.0    1.8 - 4.0 cm  Derived variables:  LVMI: 103 g/m2  RWT: 0.40  EF (Visual Estimate): 65 %  ------------------------------------------------------------------------  Observations:  Mitral Valve: Normal mitral valve.  Aortic Valve/Aorta: Normal appearing aortic valve leaflets.   Minimal aortic regurgitation directed towards the anterior  mitral leaflet.  Normal aortic root size. (Ao: 3.7 cm at the sinuses of  Valsalva).  Left Atrium: Normal left atrium.  LA volume index = 19  cc/m2.  Left Ventricle: Normal left ventricular systolic function.  No segmental wall motion abnormalities. Normal left  ventricular internal dimensions and wall thicknesses.  Normal diastolic function  Right Heart: Normal right atrium. Normal right ventricular  size and function. Normal tricuspid valve. Normal pulmonic  valve. Minimal pulmonic regurgitation.  Pericardium/Pleura: Normal pericardium with no pericardial  effusion.  Hemodynamic: EstimatedRA pressure is normal.  Pulmonary artery pressure is normal.  No PFO seen with color Doppler.  ------------------------------------------------------------------------  Conclusions:  Normal left ventricular systolic function. No segmental  wall motion abnormalities.
Patient is a 73y old  Male who presents with a chief complaint of Bradycardia    SUBJECTIVE / OVERNIGHT EVENTS: Patient seen and examined. He still feels some lightheadedness when ambulating. However, denies chest pain and SOB. No LE edema or swelling.   Patient w/ repeat 3 second sinus pause on telemetry this afternoon.     12 point ROS otherwise negative     MEDICATIONS  (STANDING):  alfuzosin 10 milliGRAM(s) Oral at bedtime  enoxaparin Injectable 40 milliGRAM(s) SubCutaneous every 24 hours  losartan 25 milliGRAM(s) Oral daily  simvastatin 20 milliGRAM(s) Oral at bedtime  sodium chloride 0.9%. 1000 milliLiter(s) (50 mL/Hr) IV Continuous <Continuous>    MEDICATIONS  (PRN):      I&O's Summary    12 May 2019 07:01  -  13 May 2019 07:00  --------------------------------------------------------  IN: 1310 mL / OUT: 1740 mL / NET: -430 mL    Vital Signs Last 24 Hrs  T(C): 36.6 (13 May 2019 13:13), Max: 36.8 (13 May 2019 04:02)  T(F): 97.9 (13 May 2019 13:13), Max: 98.2 (13 May 2019 04:02)  HR: 54 (13 May 2019 13:13) (48 - 62)  BP: 161/84 (13 May 2019 13:13) (127/73 - 161/84)  BP(mean): --  RR: 18 (13 May 2019 13:13) (16 - 18)  SpO2: 94% (13 May 2019 13:13) (94% - 96%)    PHYSICAL EXAM:  GENERAL: NAD, well-developed  EYES: EOMI, PERRLA, conjunctiva and sclera clear  NECK: Supple, No JVD, no thyromegaly  CHEST/LUNG: Clear to auscultation bilaterally; No wheeze  HEART: bradycardic; S1, S2 audible, No murmurs, rubs, or gallops  ABDOMEN: Soft, Nontender, Nondistended; Bowel sounds present  EXTREMITIES:  2+ Peripheral Pulses, No clubbing, cyanosis, or edema  NEURO: AAOx3, no focal deficit      LABS:            137  |  105  |  21  ----------------------------<  90  3.9   |  21<L>  |  1.09    Ca    9.4      13 May 2019 06:39  Phos  3.1       Mg     2.0             CARDIAC MARKERS ( 12 May 2019 07:34 )  Trop x     / CK 76 U/L / CKMB x           Urinalysis Basic - ( 11 May 2019 22:32 )    Color: Light Yellow / Appearance: Clear / S.021 / pH: x  Gluc: x / Ketone: Negative  / Bili: Negative / Urobili: Negative   Blood: x / Protein: Negative / Nitrite: Negative   Leuk Esterase: Negative / RBC: x / WBC x   Sq Epi: x / Non Sq Epi: x / Bacteria: x        RADIOLOGY & ADDITIONAL TESTS:    TELE: Sinus bradycardia 40s-70s w/ 3 sec sinus pause. No evidence of 3rd degree heart block. +P-P variation ?related to increased vagal tone  Imaging Personally Reviewed:   Consultant(s) Notes Reviewed: Card (EP)  Care Discussed with Consultants/Other Providers: EP
Mohsin Khan, MD  Attending Physician, Division Of Hospital Medicine  Pager: [phone], Office: [phone]  Off hour pager: [phone]    Patient is a 73y old  Male who presents with a chief complaint of Bradycardia    SUBJECTIVE / OVERNIGHT EVENTS:  Sitting in bed, c/o light handedness while goes to bathroom, afebrile, BP is ok but Tele- SR 40-50s down to 36 for sec    MEDICATIONS  (STANDING):  enoxaparin Injectable 40 milliGRAM(s) SubCutaneous every 24 hours  losartan 25 milliGRAM(s) Oral daily  simvastatin 20 milliGRAM(s) Oral at bedtime  sodium chloride 0.9%. 1000 milliLiter(s) (50 mL/Hr) IV Continuous <Continuous>  tamsulosin 0.8 milliGRAM(s) Oral at bedtime    MEDICATIONS  (PRN):      Vital Signs Last 24 Hrs  T(C): 37 (12 May 2019 13:40), Max: 37 (12 May 2019 13:40)  T(F): 98.6 (12 May 2019 13:40), Max: 98.6 (12 May 2019 13:40)  HR: 59 (12 May 2019 13:40) (42 - 59)  BP: 138/87 (12 May 2019 13:40) (120/72 - 152/78)  BP(mean): --  RR: 18 (12 May 2019 13:40) (17 - 18)  SpO2: 95% (12 May 2019 13:40) (94% - 98%)  CAPILLARY BLOOD GLUCOSE        I&O's Summary    11 May 2019 07:  -  12 May 2019 07:00  --------------------------------------------------------  IN: 830 mL / OUT: 800 mL / NET: 30 mL    12 May 2019 07:  -  12 May 2019 15:55  --------------------------------------------------------  IN: 0 mL / OUT: 300 mL / NET: -300 mL        PHYSICAL EXAM:-  GENERAL: NAD, well-developed  EYES: EOMI, PERRLA, conjunctiva and sclera clear  NECK: Supple, No JVD, no thyromegaly  CHEST/LUNG: Clear to auscultation bilaterally; No wheeze  HEART: Regular rate and rhythm; S1, S2 audible, No murmurs, rubs, or gallops  ABDOMEN: Soft, Nontender, Nondistended; Bowel sounds present  EXTREMITIES:  2+ Peripheral Pulses, No clubbing, cyanosis, or edema  NEURO: AAOx3, no focal deficit      LABS:                        16.7   7.8   )-----------( 141      ( 11 May 2019 12:14 )             45.8     05-12    143  |  107  |  20  ----------------------------<  98  4.0   |  25  |  1.06    Ca    9.3      12 May 2019 07:34  Phos  2.4     -  Mg     2.2     12    TPro  7.0  /  Alb  4.3  /  TBili  0.8  /  DBili  x   /  AST  18  /  ALT  29  /  AlkPhos  72  05-11    PT/INR - ( 11 May 2019 12:14 )   PT: 11.8 sec;   INR: 1.03 ratio           CARDIAC MARKERS ( 12 May 2019 07:34 )  x     / x     / 76 U/L / x     / 1.9 ng/mL      Urinalysis Basic - ( 11 May 2019 22:32 )    Color: Light Yellow / Appearance: Clear / S.021 / pH: x  Gluc: x / Ketone: Negative  / Bili: Negative / Urobili: Negative   Blood: x / Protein: Negative / Nitrite: Negative   Leuk Esterase: Negative / RBC: x / WBC x   Sq Epi: x / Non Sq Epi: x / Bacteria: x        RADIOLOGY & ADDITIONAL TESTS:    Imaging Personally Reviewed: CXR, Echo  Consultant(s) Notes Reviewed: Card (EP)  Care Discussed with Consultants/Other Providers: EP Card
Patient is a 73y old  Male who presents with a chief complaint of Bradycardia    SUBJECTIVE / OVERNIGHT EVENTS: Patient seen and examined. Feels much better today. Reports he ambulated this AM before breakfast and did not feel light headed or dizzy. Wore his CPAP overnight without removal. Denies chest pain and SOB. No LE edema or swelling.     MEDICATIONS  (STANDING):  enoxaparin Injectable 40 milliGRAM(s) SubCutaneous every 24 hours  simvastatin 20 milliGRAM(s) Oral at bedtime    MEDICATIONS  (PRN):      I&O's Summary    14 May 2019 07:01  -  15 May 2019 07:00  --------------------------------------------------------  IN: 640 mL / OUT: 450 mL / NET: 190 mL    15 May 2019 07:01  -  15 May 2019 12:42  --------------------------------------------------------  IN: 300 mL / OUT: 0 mL / NET: 300 mL    Vital Signs Last 24 Hrs  T(C): 36.3 (15 May 2019 12:00), Max: 36.6 (14 May 2019 23:37)  T(F): 97.4 (15 May 2019 12:00), Max: 97.9 (14 May 2019 23:37)  HR: 72 (15 May 2019 12:00) (46 - 72)  BP: 140/80 (15 May 2019 12:00) (131/81 - 145/75)  BP(mean): --  RR: 18 (15 May 2019 12:00) (18 - 18)  SpO2: 99% (15 May 2019 12:00) (94% - 100%)    PHYSICAL EXAM:  GENERAL: NAD, well-developed  EYES: EOMI, PERRLA, conjunctiva and sclera clear  NECK: Supple, No JVD, no thyromegaly  CHEST/LUNG: Clear to auscultation bilaterally; No wheeze  HEART: bradycardic; S1, S2 audible, No murmurs, rubs, or gallops  ABDOMEN: Soft, Nontender, Nondistended; Bowel sounds present  EXTREMITIES:  2+ Peripheral Pulses, No clubbing, cyanosis, or edema  NEURO: AAOx3, no focal deficit      LABS:      No labs today    RADIOLOGY & ADDITIONAL TESTS:    TELE: Sinus 40s-60s  Imaging Personally Reviewed:   Consultant(s) Notes Reviewed: Card (EP)  Care Discussed with Consultants/Other Providers: EP

## 2019-05-15 NOTE — PROGRESS NOTE ADULT - PROBLEM SELECTOR PLAN 1
Patient remains intermittently bradycardic w/ two overnight sinus pauses. EP f/ appreciated and d/w EP team in detail. They suspect vagally mediated pauses from intermittent CPAP use overnight (patient does report minimal compliance). There is no evidence of Mobitz Type II or CHB on telemetry. Currently no absolute indications for PPM placement.   - Plan for monitoring patch placement x 2 weeks on the day of D/C  - No further inpatient ischemic w/u   - TTE unremarkable  - Lyme Ab negative  - c/w tele monitoring for now
Patient remains intermittently symptomatic, although reports he feels somewhat better than on admission. EP f/u appreciated. Still w/ bradycardia on telemetry.  - Patient not candidate for nuclear stress test right now w/ sinus pause   - General cardiology consulted  - Avoid AV clayton blockers, c/w tele monitoring  - As per EP, no need for PPM placement at this time   - f/u Lyme titers
Still c/o light headedness on ambulation, Tele- SR 40-50s, 1 HB, As per report from OSH he had peuse 4.8 sec and 2 degree HB  - Normal Echo, TSH, hsTrop,   - Appreciated EP cardiology. Recommended exercise stress test, ordered orthostatic vitals  - Off Amlodipine  - sent Lyme titer
Some episodes of bradycardia overnight, no pauses. Asymptomatic. EP f/u appreciated and d/w EP team in detail. They suspect vagally mediated pauses from intermittent CPAP use overnight (patient does report minimal compliance). There is no evidence of Mobitz Type II or CHB on telemetry. Currently no absolute indications for PPM placement.   - Plan for monitoring patch placement x 2 weeks on the day of D/C (today)  - No further inpatient ischemic w/u   - TTE unremarkable  - Lyme Ab negative

## 2019-05-15 NOTE — PROGRESS NOTE ADULT - PROBLEM SELECTOR PLAN 2
Resolved s/p IVF yesterday and holding BP meds. Orthostatics now negative. Symptomatically improved, suspect some of his symptoms are related to his antihypertensive medications.  - Hold all BP meds and alfuzosin on D/C
Patient orthostatic this AM. Suspect this is more likely etiology of current symptoms. Patient on multiple antihypertensives at home, in addition to alfuzosin (well-known to cause orthostatic hypotension)  - Hold all BP meds and alfuzosin today  - Give NS IVF bolus  - c/w monitoring vitals/orthostatics
Controlled.   -c/w losartan 100 mg Daily  - Holding amlodipine
BP has been stable, Continue Losartan 100 mg Daily  Holding amlodipine

## 2019-05-21 PROBLEM — E78.5 HYPERLIPIDEMIA, UNSPECIFIED: Chronic | Status: ACTIVE | Noted: 2019-05-11

## 2019-05-21 PROBLEM — I10 ESSENTIAL (PRIMARY) HYPERTENSION: Chronic | Status: ACTIVE | Noted: 2019-05-11

## 2019-05-21 PROBLEM — N40.0 BENIGN PROSTATIC HYPERPLASIA WITHOUT LOWER URINARY TRACT SYMPTOMS: Chronic | Status: ACTIVE | Noted: 2019-05-11

## 2019-05-28 ENCOUNTER — FORM ENCOUNTER (OUTPATIENT)
Age: 74
End: 2019-05-28

## 2019-05-28 LAB
HCV AB S/CO SERPL IA: 0.1 S/CO — SIGNIFICANT CHANGE UP (ref 0–0.99)
HCV AB SERPL-IMP: SIGNIFICANT CHANGE UP

## 2019-06-12 ENCOUNTER — APPOINTMENT (OUTPATIENT)
Dept: HEART AND VASCULAR | Facility: CLINIC | Age: 74
End: 2019-06-12
Payer: MEDICARE

## 2019-06-12 ENCOUNTER — NON-APPOINTMENT (OUTPATIENT)
Age: 74
End: 2019-06-12

## 2019-06-12 VITALS
WEIGHT: 180 LBS | BODY MASS INDEX: 27.28 KG/M2 | SYSTOLIC BLOOD PRESSURE: 141 MMHG | DIASTOLIC BLOOD PRESSURE: 76 MMHG | HEART RATE: 56 BPM | HEIGHT: 68 IN

## 2019-06-12 DIAGNOSIS — I10 ESSENTIAL (PRIMARY) HYPERTENSION: ICD-10-CM

## 2019-06-12 PROCEDURE — 93000 ELECTROCARDIOGRAM COMPLETE: CPT

## 2019-06-12 PROCEDURE — 99204 OFFICE O/P NEW MOD 45 MIN: CPT | Mod: 25

## 2019-06-12 RX ORDER — LOSARTAN POTASSIUM 100 MG/1
100 TABLET, FILM COATED ORAL DAILY
Qty: 1 | Refills: 0 | Status: ACTIVE | COMMUNITY
Start: 2019-06-12

## 2019-06-12 RX ORDER — SIMVASTATIN 20 MG/1
20 TABLET, FILM COATED ORAL DAILY
Qty: 30 | Refills: 0 | Status: ACTIVE | COMMUNITY
Start: 2019-06-12

## 2019-07-02 ENCOUNTER — TRANSCRIPTION ENCOUNTER (OUTPATIENT)
Age: 74
End: 2019-07-02

## 2019-07-02 ENCOUNTER — INPATIENT (INPATIENT)
Facility: HOSPITAL | Age: 74
LOS: 0 days | Discharge: HOME | DRG: 247 | End: 2019-07-03
Attending: INTERNAL MEDICINE | Admitting: INTERNAL MEDICINE
Payer: MEDICARE

## 2019-07-02 VITALS
SYSTOLIC BLOOD PRESSURE: 151 MMHG | HEART RATE: 46 BPM | RESPIRATION RATE: 16 BRPM | TEMPERATURE: 98 F | WEIGHT: 179.9 LBS | DIASTOLIC BLOOD PRESSURE: 72 MMHG | HEIGHT: 66 IN | OXYGEN SATURATION: 97 %

## 2019-07-02 DIAGNOSIS — Z98.890 OTHER SPECIFIED POSTPROCEDURAL STATES: Chronic | ICD-10-CM

## 2019-07-02 DIAGNOSIS — Z85.828 PERSONAL HISTORY OF OTHER MALIGNANT NEOPLASM OF SKIN: Chronic | ICD-10-CM

## 2019-07-02 DIAGNOSIS — R94.39 ABNORMAL RESULT OF OTHER CARDIOVASCULAR FUNCTION STUDY: ICD-10-CM

## 2019-07-02 DIAGNOSIS — Z90.49 ACQUIRED ABSENCE OF OTHER SPECIFIED PARTS OF DIGESTIVE TRACT: Chronic | ICD-10-CM

## 2019-07-02 LAB
ALBUMIN SERPL ELPH-MCNC: 4.1 G/DL — SIGNIFICANT CHANGE UP (ref 3.3–5)
ALP SERPL-CCNC: 57 U/L — SIGNIFICANT CHANGE UP (ref 40–120)
ALT FLD-CCNC: 31 U/L — SIGNIFICANT CHANGE UP (ref 10–45)
ANION GAP SERPL CALC-SCNC: 11 MMOL/L — SIGNIFICANT CHANGE UP (ref 5–17)
ANION GAP SERPL CALC-SCNC: 9 MMOL/L — SIGNIFICANT CHANGE UP (ref 5–17)
AST SERPL-CCNC: 55 U/L — HIGH (ref 10–40)
BILIRUB SERPL-MCNC: 1 MG/DL — SIGNIFICANT CHANGE UP (ref 0.2–1.2)
BUN SERPL-MCNC: 18 MG/DL — SIGNIFICANT CHANGE UP (ref 7–23)
BUN SERPL-MCNC: 19 MG/DL — SIGNIFICANT CHANGE UP (ref 7–23)
CALCIUM SERPL-MCNC: 9 MG/DL — SIGNIFICANT CHANGE UP (ref 8.4–10.5)
CALCIUM SERPL-MCNC: 9.1 MG/DL — SIGNIFICANT CHANGE UP (ref 8.4–10.5)
CHLORIDE SERPL-SCNC: 106 MMOL/L — SIGNIFICANT CHANGE UP (ref 96–108)
CHLORIDE SERPL-SCNC: 108 MMOL/L — SIGNIFICANT CHANGE UP (ref 96–108)
CO2 SERPL-SCNC: 23 MMOL/L — SIGNIFICANT CHANGE UP (ref 22–31)
CO2 SERPL-SCNC: 23 MMOL/L — SIGNIFICANT CHANGE UP (ref 22–31)
CREAT SERPL-MCNC: 1.08 MG/DL — SIGNIFICANT CHANGE UP (ref 0.5–1.3)
CREAT SERPL-MCNC: 1.09 MG/DL — SIGNIFICANT CHANGE UP (ref 0.5–1.3)
GLUCOSE SERPL-MCNC: 86 MG/DL — SIGNIFICANT CHANGE UP (ref 70–99)
GLUCOSE SERPL-MCNC: 86 MG/DL — SIGNIFICANT CHANGE UP (ref 70–99)
HCT VFR BLD CALC: 44.4 % — SIGNIFICANT CHANGE UP (ref 39–50)
HGB BLD-MCNC: 15.8 G/DL — SIGNIFICANT CHANGE UP (ref 13–17)
MCHC RBC-ENTMCNC: 33.5 PG — SIGNIFICANT CHANGE UP (ref 27–34)
MCHC RBC-ENTMCNC: 35.5 GM/DL — SIGNIFICANT CHANGE UP (ref 32–36)
MCV RBC AUTO: 94.3 FL — SIGNIFICANT CHANGE UP (ref 80–100)
PLATELET # BLD AUTO: 137 K/UL — LOW (ref 150–400)
POTASSIUM SERPL-MCNC: 4 MMOL/L — SIGNIFICANT CHANGE UP (ref 3.5–5.3)
POTASSIUM SERPL-MCNC: 7.8 MMOL/L — CRITICAL HIGH (ref 3.5–5.3)
POTASSIUM SERPL-SCNC: 4 MMOL/L — SIGNIFICANT CHANGE UP (ref 3.5–5.3)
POTASSIUM SERPL-SCNC: 7.8 MMOL/L — CRITICAL HIGH (ref 3.5–5.3)
PROT SERPL-MCNC: 7.5 G/DL — SIGNIFICANT CHANGE UP (ref 6–8.3)
RBC # BLD: 4.71 M/UL — SIGNIFICANT CHANGE UP (ref 4.2–5.8)
RBC # FLD: 12.6 % — SIGNIFICANT CHANGE UP (ref 10.3–14.5)
SODIUM SERPL-SCNC: 138 MMOL/L — SIGNIFICANT CHANGE UP (ref 135–145)
SODIUM SERPL-SCNC: 142 MMOL/L — SIGNIFICANT CHANGE UP (ref 135–145)
WBC # BLD: 7.5 K/UL — SIGNIFICANT CHANGE UP (ref 3.8–10.5)
WBC # FLD AUTO: 7.5 K/UL — SIGNIFICANT CHANGE UP (ref 3.8–10.5)

## 2019-07-02 PROCEDURE — 99152 MOD SED SAME PHYS/QHP 5/>YRS: CPT | Mod: GC

## 2019-07-02 PROCEDURE — 92928 PRQ TCAT PLMT NTRAC ST 1 LES: CPT | Mod: LD,GC

## 2019-07-02 PROCEDURE — 93454 CORONARY ARTERY ANGIO S&I: CPT | Mod: 26,59,GC

## 2019-07-02 RX ORDER — LOSARTAN POTASSIUM 100 MG/1
1 TABLET, FILM COATED ORAL
Qty: 0 | Refills: 0 | DISCHARGE

## 2019-07-02 RX ORDER — ASPIRIN/CALCIUM CARB/MAGNESIUM 324 MG
1 TABLET ORAL
Qty: 0 | Refills: 0 | DISCHARGE

## 2019-07-02 RX ORDER — SIMVASTATIN 20 MG/1
40 TABLET, FILM COATED ORAL AT BEDTIME
Refills: 0 | Status: DISCONTINUED | OUTPATIENT
Start: 2019-07-02 | End: 2019-07-03

## 2019-07-02 RX ORDER — SODIUM CHLORIDE 9 MG/ML
3 INJECTION INTRAMUSCULAR; INTRAVENOUS; SUBCUTANEOUS EVERY 8 HOURS
Refills: 0 | Status: DISCONTINUED | OUTPATIENT
Start: 2019-07-02 | End: 2019-07-03

## 2019-07-02 RX ORDER — SIMVASTATIN 20 MG/1
0 TABLET, FILM COATED ORAL
Qty: 90 | Refills: 0 | DISCHARGE

## 2019-07-02 RX ORDER — CLOPIDOGREL BISULFATE 75 MG/1
75 TABLET, FILM COATED ORAL DAILY
Refills: 0 | Status: DISCONTINUED | OUTPATIENT
Start: 2019-07-03 | End: 2019-07-03

## 2019-07-02 RX ORDER — ASPIRIN/CALCIUM CARB/MAGNESIUM 324 MG
81 TABLET ORAL DAILY
Refills: 0 | Status: DISCONTINUED | OUTPATIENT
Start: 2019-07-03 | End: 2019-07-03

## 2019-07-02 RX ORDER — LOSARTAN POTASSIUM 100 MG/1
100 TABLET, FILM COATED ORAL DAILY
Refills: 0 | Status: DISCONTINUED | OUTPATIENT
Start: 2019-07-02 | End: 2019-07-03

## 2019-07-02 RX ORDER — SIMVASTATIN 20 MG/1
20 TABLET, FILM COATED ORAL AT BEDTIME
Refills: 0 | Status: DISCONTINUED | OUTPATIENT
Start: 2019-07-02 | End: 2019-07-02

## 2019-07-02 RX ADMIN — SIMVASTATIN 40 MILLIGRAM(S): 20 TABLET, FILM COATED ORAL at 21:41

## 2019-07-02 RX ADMIN — SODIUM CHLORIDE 3 MILLILITER(S): 9 INJECTION INTRAMUSCULAR; INTRAVENOUS; SUBCUTANEOUS at 22:00

## 2019-07-02 NOTE — CHART NOTE - NSCHARTNOTEFT_GEN_A_CORE
Patient underwent a PCI procedure and is being admitted as they are at increased risk for major adverse cardiac and vascular events if discharged due to the following high risk characteristics:  for long lesion >28cm and pLAD lesion a/w unstable angina

## 2019-07-02 NOTE — H&P CARDIOLOGY - PMH
BPH (benign prostatic hyperplasia)    HLD (hyperlipidemia)    HTN (hypertension)    Hyperlipidemia    Kidney stone    REYNA on CPAP    Sinus bradycardia    Skin cancer

## 2019-07-02 NOTE — H&P CARDIOLOGY - PSH
H/O Mohs micrographic surgery for skin cancer    S/P appendectomy    S/P hemorrhoidectomy    S/P hernia repair

## 2019-07-02 NOTE — H&P CARDIOLOGY - HISTORY OF PRESENT ILLNESS
73 year old  male with PMH of 73 year old  male with PMH of REYNA -on CPAP,  HTN, HLD, sinus bradycardia, no implantable cardiac devices, BPH +  kidney stone presents today for LHC. Patient reports he had routine apt with Dr King Mabry where cardiac CT  was done revealing pLAD disease (verbal report from Dr Severino). Referred for C. Asymptomatic

## 2019-07-02 NOTE — H&P CARDIOLOGY - FAMILY HISTORY
Father  Still living? No  Family history of stroke or transient ischemic attack in father, Age at diagnosis: Age Unknown

## 2019-07-03 ENCOUNTER — TRANSCRIPTION ENCOUNTER (OUTPATIENT)
Age: 74
End: 2019-07-03

## 2019-07-03 VITALS
SYSTOLIC BLOOD PRESSURE: 143 MMHG | TEMPERATURE: 98 F | OXYGEN SATURATION: 97 % | HEART RATE: 52 BPM | RESPIRATION RATE: 17 BRPM | DIASTOLIC BLOOD PRESSURE: 86 MMHG

## 2019-07-03 LAB
ANION GAP SERPL CALC-SCNC: 10 MMOL/L — SIGNIFICANT CHANGE UP (ref 5–17)
BASOPHILS # BLD AUTO: 0 K/UL — SIGNIFICANT CHANGE UP (ref 0–0.2)
BASOPHILS NFR BLD AUTO: 0.4 % — SIGNIFICANT CHANGE UP (ref 0–2)
BUN SERPL-MCNC: 20 MG/DL — SIGNIFICANT CHANGE UP (ref 7–23)
CALCIUM SERPL-MCNC: 8.6 MG/DL — SIGNIFICANT CHANGE UP (ref 8.4–10.5)
CHLORIDE SERPL-SCNC: 105 MMOL/L — SIGNIFICANT CHANGE UP (ref 96–108)
CO2 SERPL-SCNC: 22 MMOL/L — SIGNIFICANT CHANGE UP (ref 22–31)
CREAT SERPL-MCNC: 1.03 MG/DL — SIGNIFICANT CHANGE UP (ref 0.5–1.3)
EOSINOPHIL # BLD AUTO: 0.2 K/UL — SIGNIFICANT CHANGE UP (ref 0–0.5)
EOSINOPHIL NFR BLD AUTO: 2.3 % — SIGNIFICANT CHANGE UP (ref 0–6)
GLUCOSE SERPL-MCNC: 102 MG/DL — HIGH (ref 70–99)
HCT VFR BLD CALC: 46.6 % — SIGNIFICANT CHANGE UP (ref 39–50)
HGB BLD-MCNC: 15.4 G/DL — SIGNIFICANT CHANGE UP (ref 13–17)
LYMPHOCYTES # BLD AUTO: 3.2 K/UL — SIGNIFICANT CHANGE UP (ref 1–3.3)
LYMPHOCYTES # BLD AUTO: 40.5 % — SIGNIFICANT CHANGE UP (ref 13–44)
MCHC RBC-ENTMCNC: 30.6 PG — SIGNIFICANT CHANGE UP (ref 27–34)
MCHC RBC-ENTMCNC: 33.2 GM/DL — SIGNIFICANT CHANGE UP (ref 32–36)
MCV RBC AUTO: 92.4 FL — SIGNIFICANT CHANGE UP (ref 80–100)
MONOCYTES # BLD AUTO: 0.5 K/UL — SIGNIFICANT CHANGE UP (ref 0–0.9)
MONOCYTES NFR BLD AUTO: 6.7 % — SIGNIFICANT CHANGE UP (ref 2–14)
NEUTROPHILS # BLD AUTO: 4 K/UL — SIGNIFICANT CHANGE UP (ref 1.8–7.4)
NEUTROPHILS NFR BLD AUTO: 50.1 % — SIGNIFICANT CHANGE UP (ref 43–77)
PLATELET # BLD AUTO: 146 K/UL — LOW (ref 150–400)
POTASSIUM SERPL-MCNC: 3.8 MMOL/L — SIGNIFICANT CHANGE UP (ref 3.5–5.3)
POTASSIUM SERPL-SCNC: 3.8 MMOL/L — SIGNIFICANT CHANGE UP (ref 3.5–5.3)
RBC # BLD: 5.04 M/UL — SIGNIFICANT CHANGE UP (ref 4.2–5.8)
RBC # FLD: 12.2 % — SIGNIFICANT CHANGE UP (ref 10.3–14.5)
SODIUM SERPL-SCNC: 137 MMOL/L — SIGNIFICANT CHANGE UP (ref 135–145)
WBC # BLD: 8 K/UL — SIGNIFICANT CHANGE UP (ref 3.8–10.5)
WBC # FLD AUTO: 8 K/UL — SIGNIFICANT CHANGE UP (ref 3.8–10.5)

## 2019-07-03 PROCEDURE — 93005 ELECTROCARDIOGRAM TRACING: CPT

## 2019-07-03 PROCEDURE — 99152 MOD SED SAME PHYS/QHP 5/>YRS: CPT

## 2019-07-03 PROCEDURE — C1874: CPT

## 2019-07-03 PROCEDURE — C1887: CPT

## 2019-07-03 PROCEDURE — 93454 CORONARY ARTERY ANGIO S&I: CPT | Mod: 59

## 2019-07-03 PROCEDURE — C1894: CPT

## 2019-07-03 PROCEDURE — 99238 HOSP IP/OBS DSCHRG MGMT 30/<: CPT

## 2019-07-03 PROCEDURE — 94660 CPAP INITIATION&MGMT: CPT

## 2019-07-03 PROCEDURE — 80053 COMPREHEN METABOLIC PANEL: CPT

## 2019-07-03 PROCEDURE — 99153 MOD SED SAME PHYS/QHP EA: CPT

## 2019-07-03 PROCEDURE — C9600: CPT | Mod: LD

## 2019-07-03 PROCEDURE — 80048 BASIC METABOLIC PNL TOTAL CA: CPT

## 2019-07-03 PROCEDURE — C1769: CPT

## 2019-07-03 PROCEDURE — 85027 COMPLETE CBC AUTOMATED: CPT

## 2019-07-03 RX ORDER — SIMVASTATIN 20 MG/1
1 TABLET, FILM COATED ORAL
Qty: 0 | Refills: 0 | DISCHARGE

## 2019-07-03 RX ORDER — SIMVASTATIN 20 MG/1
1 TABLET, FILM COATED ORAL
Qty: 30 | Refills: 0
Start: 2019-07-03 | End: 2019-08-01

## 2019-07-03 RX ORDER — CLOPIDOGREL BISULFATE 75 MG/1
1 TABLET, FILM COATED ORAL
Qty: 90 | Refills: 3
Start: 2019-07-03 | End: 2020-06-26

## 2019-07-03 RX ADMIN — CLOPIDOGREL BISULFATE 75 MILLIGRAM(S): 75 TABLET, FILM COATED ORAL at 05:40

## 2019-07-03 RX ADMIN — SODIUM CHLORIDE 3 MILLILITER(S): 9 INJECTION INTRAMUSCULAR; INTRAVENOUS; SUBCUTANEOUS at 05:41

## 2019-07-03 RX ADMIN — LOSARTAN POTASSIUM 100 MILLIGRAM(S): 100 TABLET, FILM COATED ORAL at 05:40

## 2019-07-03 RX ADMIN — Medication 81 MILLIGRAM(S): at 05:40

## 2019-07-03 NOTE — DISCHARGE NOTE PROVIDER - HOSPITAL COURSE
HPI:    73 year old  male with PMH of REYNA -on CPAP,  HTN, HLD, sinus bradycardia, no implantable cardiac devices, BPH +  kidney stone presents today for LHC. Patient reports he had routine apt with Dr King Mabry where cardiac CT  was done revealing pLAD disease (verbal report from Dr Sevreino). Referred for LHC. Asymptomatic (02 Jul 2019 11:34)        7/2 cardiac cath with one stent to the prox LAD. Right radial site without swelling, bleeding. 73 year old  male with PMH of REYNA -on CPAP,  HTN, HLD, sinus bradycardia, no implantable cardiac devices, BPH +  kidney stone presents today for [facility]. Patient reports he had routine apt with Dr King Mabry where cardiac CT  was done revealing pLAD disease (verbal report from Dr Severino). Referred for [facility].  Patient is now s/p PCI MACIEL x 1 pLAD via RRA access.  He tolerated the procedure well.  Post PCI EKG without acute changes.  RRA site benign without presence of bleeding/hematoma, area diffusely ecchymotic, patient is without complaints, pulses palpable.  Patient remains hemodynamically stable and his hospital course was otherwise uneventful.  Patient is now medically stable for discharge home today.

## 2019-07-03 NOTE — DISCHARGE NOTE PROVIDER - NSDCCPTREATMENT_GEN_ALL_CORE_FT
PRINCIPAL PROCEDURE  Procedure: Placement of coronary artery stent  Findings and Treatment: one prox LAD stent

## 2019-07-03 NOTE — DISCHARGE NOTE PROVIDER - NSDCCPCAREPLAN_GEN_ALL_CORE_FT
PRINCIPAL DISCHARGE DIAGNOSIS  Diagnosis: CAD (coronary artery disease), native coronary artery  Assessment and Plan of Treatment: Do not stop your aspirin or Plavix unless instructed to do so by your cardiologist, they help keep your stented arteries open.   No heavy lifting or pushing/pulling with procedure arm for 2 weeks. No driving for 2 days. You may shower 24 hours following the procedure but avoid baths/swimming for 1 week. Check your wrist site for bleeding and/or swelling daily following procedure and call your doctor immediately if it occurs or if you experience increased pain at the site. Follow up with your cardiologist in 1-2 weeks. You may call [facility] if you have any questions/concerns regarding your procedure [phone].      SECONDARY DISCHARGE DIAGNOSES  Diagnosis: HTN (hypertension)  Assessment and Plan of Treatment: Continue with your blood pressure medications; eat a heart healthy diet with low salt diet; exercise regularly (consult with your physician or cardiologist first); maintain a heart healthy weight; if you smoke - quit (A resource to help you stop smoking is the [facility] [employer] – phone number [phone].); include healthy ways to manage stress. Continue to follow with your primary care physician or cardiologist.    Diagnosis: HLD (hyperlipidemia)  Assessment and Plan of Treatment: Continue with your cholesterol medications. Eat a heart healthy diet that is low in saturated fats and salt, and includes whole grains, fruits, vegetables and lean protein; exercise regularly (consult with your physician or cardiologist first); maintain a heart healthy weight; if you smoke - quit (A resource to help you stop smoking is the [facility] [employer] – phone number [phone].). Continue to follow with your primary physician or cardiologist.

## 2019-07-03 NOTE — PROGRESS NOTE ADULT - SUBJECTIVE AND OBJECTIVE BOX
S/P stent to 70% proximal LAD stenosis.  40% mid RCA present.    No c/o chest pain or dyspnea.        REVIEW OF SYSTEMS:  CARDIOVASCULAR: No chest pain, dyspnea or palpitations  All other review of systems is negative unless indicated above    Medications:  losartan 100 milliGRAM(s) Oral daily  simvastatin 40 milliGRAM(s) Oral at bedtime  sodium chloride 0.9% lock flush 3 milliLiter(s) IV Push every 8 hours      Physical Exam:  Vitals:  T(C): 36.7 (19 @ 15:01), Max: 36.8 (19 @ 11:34)  HR: 51 (19 @ 19:10) (42 - 51)  BP: 149/78 (19 @ 19:10) (123/57 - 151/72)  BP(mean): 98 (19 @ 11:34) (98 - 98)  RR: 18 (19 @ 19:10) (16 - 18)  SpO2: 98% (19 @ 19:10) (97% - 100%)  Wt(kg): --  Daily Height in cm: 167.64 (2019 15:01)    Daily Weight in k.6 (2019 11:34)  I&O's Summary      Appearance:  Normal, NAD  Eyes:  EOMI  Neck:  No JVD  Respiratory: Clear to auscultation bilaterally  Cardiovascular: Normal S1 and S2 without murmurs, rubs or gallops  Abdomen:   BS normal, Soft,  Non-tender without organomegaly or masses  Extremities: Without edema.  R radial pulse good with only minimal tenderness              Complete Blood Count (19 @ 12:01)    WBC Count: 7.5 K/uL    RBC Count: 4.71 M/uL    Hemoglobin: 15.8 g/dL    Hematocrit: 44.4 %    Mean Cell Volume: 94.3 fl    Mean Cell Hemoglobin: 33.5 pg    Mean Cell Hemoglobin Conc: 35.5 gm/dL    Red Cell Distrib Width: 12.6 %    Platelet Count - Automated: 137 K/uL        142  |  108  |  18  ----------------------------<  86  4.0   |  23  |  1.09    Ca    9.0      2019 14:00    TPro  7.5  /  Alb  4.1  /  TBili  1.0  /  DBili  x   /  AST  55<H>  /  ALT  31  /  AlkPhos  57  
No c/o.  Wrist with minimal discomfort.        REVIEW OF SYSTEMS:  CARDIOVASCULAR: No chest pain, dyspnea or palpitations  All other review of systems is negative unless indicated above    Medications:  aspirin enteric coated 81 milliGRAM(s) Oral daily  clopidogrel Tablet 75 milliGRAM(s) Oral daily  losartan 100 milliGRAM(s) Oral daily  simvastatin 40 milliGRAM(s) Oral at bedtime  sodium chloride 0.9% lock flush 3 milliLiter(s) IV Push every 8 hours      Physical Exam:  Vitals:  T(C): 36.4 (19 @ 05:38), Max: 36.8 (19 @ 11:34)  HR: 47 (19 @ 05:46) (42 - 51)  BP: 146/80 (19 @ 05:38) (123/57 - 151/72)  BP(mean): 98 (19 @ 11:34) (98 - 98)  RR: 18 (19 @ 05:38) (16 - 18)  SpO2: 96% (19 @ 05:46) (95% - 100%)  Wt(kg): --  Daily Height in cm: 167.64 (2019 15:01)    Daily Weight in k.6 (2019 11:34)  I&O's Summary    2019 07:01  -  2019 07:00  --------------------------------------------------------  IN: 720 mL / OUT: 550 mL / NET: 170 mL      Appearance:  Normal, NAD  Eyes:  EOMI  Neck:  No JVD  Respiratory: Clear to auscultation bilaterally  Cardiovascular: Normal S1 and S2 without murmurs, rubs or gallops  Abdomen:   BS normal, Soft,  Non-tender without organomegaly or masses  Extremities: Without edema.  R radial pulse good with only minimal tenderness                  Complete Blood Count + Automated Diff (19 @ 05:45)    WBC Count: 8.0 K/uL    RBC Count: 5.04 M/uL    Hemoglobin: 15.4 g/dL    Hematocrit: 46.6 %    Mean Cell Volume: 92.4 fl    Mean Cell Hemoglobin: 30.6 pg    Mean Cell Hemoglobin Conc: 33.2 gm/dL    Red Cell Distrib Width: 12.2 %    Platelet Count - Automated: 146 K/uL     Auto Neutrophil %: 50.1: Differential percentages must be correlated with absolute numbers for    clinical significance. %    Auto Lymphocyte %: 40.5 %    Auto Monocyte %: 6.7 %    Auto Eosinophil %: 2.3 %    Auto Basophil %: 0.4 %        137  |  105  |  20  ----------------------------<  102<H>  3.8   |  22  |  1.03    Ca    8.6      2019 05:45    TPro  7.5  /  Alb  4.1  /  TBili  1.0  /  DBili  x   /  AST  55<H>  /  ALT  31  /  AlkPhos  57  07-02

## 2019-07-03 NOTE — DISCHARGE NOTE NURSING/CASE MANAGEMENT/SOCIAL WORK - NSDCDPATPORTLINK_GEN_ALL_CORE
You can access the [facility] Patient Portal, offered by [facility], by registering with the following website: [email or web address]

## 2019-07-03 NOTE — DISCHARGE NOTE PROVIDER - CARE PROVIDER_API CALL
King Mabry)  Cardiovascular Disease; Internal Medicine  [facility], [place] [address]  Phone: [phone]  Fax: [phone]  Follow Up Time: King Mabry)  Cardiovascular Disease; Internal Medicine  [facility], [place] [address]  Phone: [phone]  Fax: [phone]  Follow Up Time: 2 weeks

## 2019-07-03 NOTE — DISCHARGE NOTE PROVIDER - NSDCACTIVITY_GEN_ALL_CORE
Walking - Indoors allowed/No heavy lifting/straining/Walking - Outdoors allowed/Showering allowed/Stairs allowed

## 2019-07-03 NOTE — DISCHARGE NOTE PROVIDER - NSDCPNSUBOBJ_GEN_ALL_CORE
Patient is a 73y old  Male who presents with a chief complaint of abnormal cardiac CT (2019 02:32)                    Allergies        latex (Rash)    No Known Drug Allergies        Intolerances                Medications:    aspirin enteric coated 81 milliGRAM(s) Oral daily    clopidogrel Tablet 75 milliGRAM(s) Oral daily    losartan 100 milliGRAM(s) Oral daily    simvastatin 40 milliGRAM(s) Oral at bedtime    sodium chloride 0.9% lock flush 3 milliLiter(s) IV Push every 8 hours            Vitals:    T(C): 36.7 (19 @ 20:10), Max: 36.8 (19 @ 11:34)    HR: 47 (19 @ 22:43) (42 - 51)    BP: 144/75 (19 @ 20:10) (123/57 - 151/72)    BP(mean): 98 (19 @ 11:34) (98 - 98)    RR: 18 (19 @ 20:10) (16 - 18)    SpO2: 95% (19 @ 22:43) (95% - 100%)    Wt(kg): --    Daily Height in cm: 167.64 (2019 15:01)      Daily Weight in k.6 (2019 11:34)    I&O's Summary        2019 07:01  -  2019 05:37    --------------------------------------------------------    IN: 600 mL / OUT: 550 mL / NET: 50 mL                    Physical Exam:    Appearance: Normal    Eyes: PERRL, EOMI    HENT: Normal oral mus[place]a, NC/AT    Cardiovascular: S1S2, RRR, No M/R/G, no JVD, No Lower extremity edema    Procedural Access Site: No hematoma, Non-tender to palpation, 2+ pulse, No bruit, No Ecchymosis    Respiratory: Clear to auscultation bilaterally    Gastrointestinal: Soft, Non tender, Normal Bowel Sounds    Musculoskeletal: No clubbing, No joint deformity     Neurologic: Non-focal    Lymphatic: No lymphadenopathy    Psychiatry: AAOx3, Mood & affect appropriate    Skin: No rashes, No ecchymoses, No cyanosis                142  |  108  |  18    ----------------------------<  86    4.0   |  23  |  1.09        Ca    9.0      2019 14:00        TPro  7.5  /  Alb  4.1  /  TBili  1.0  /  DBili  x   /  AST  55<H>  /  ALT  31  /  AlkPhos  57                          Lipid panel     Hgb A1c                             15.8     7.5   )-----------( 137      ( 2019 12:01 )               44.4                 ECG: SB 42 bpm        Cath: one prox LAD stent        Imaging:        Interpretation of Telemetry: SB 40-50 bpm            Monitor radial site for swelling, bleeding    Continue ASA, Plavix    Continue antihypertensive medications with hold parameters     continue statin    confirm lipid panel results

## 2019-07-03 NOTE — PROGRESS NOTE ADULT - ASSESSMENT
Impression:   Stable S/P LAD stent.    Plan:  Increase simvastatin to 40 mg q PM (LDL was 80 during May admission).            Baseline sinus analia.  Had Wenckebach block during prior admission with greater than 3 second pause due to it.  R/O effect of REYNA.            D/C in AM if remains stable.
Impression:  Stable S/P prox LAD stent.                       Lab results are good.                        Mildly elevated BP systolic.    Plan: D/C to home on current meds              Office f/u in 2 weeks and will address sytolic HTN if still present at that time.

## 2019-08-25 NOTE — DISCUSSION/SUMMARY
[FreeTextEntry1] : Mr. Espino is a 74 year-old male with near-syncope and nocturnal pauses.  I have informed him that I believe the findings on his event monitor (bradycardia and pauses up to 3 seconds while sleeping) are related to REYNA and that he should proceed with a trial of CPAP.  In addition, I would like to proceed with a treadmill stress next visit to evaluate his degree chronotropic competence.  We will decide on the need for pacemaker following the above.  He knows to contact me or return with any new or worsening symptoms.

## 2019-08-25 NOTE — PHYSICAL EXAM
[Normal Conjunctiva] : the conjunctiva exhibited no abnormalities [Eyelids - No Xanthelasma] : the eyelids demonstrated no xanthelasmas [Normal Oral Mucosa] : normal oral mucosa [No Oral Pallor] : no oral pallor [No Oral Cyanosis] : no oral cyanosis [Normal Jugular Venous A Waves Present] : normal jugular venous A waves present [Normal Jugular Venous V Waves Present] : normal jugular venous V waves present [No Jugular Venous Gama A Waves] : no jugular venous gama A waves [Heart Rate And Rhythm] : heart rate and rhythm were normal [Heart Sounds] : normal S1 and S2 [Murmurs] : no murmurs present [Respiration, Rhythm And Depth] : normal respiratory rhythm and effort [Exaggerated Use Of Accessory Muscles For Inspiration] : no accessory muscle use [Auscultation Breath Sounds / Voice Sounds] : lungs were clear to auscultation bilaterally [Abdomen Soft] : soft [Abdomen Tenderness] : non-tender [Abdomen Mass (___ Cm)] : no abdominal mass palpated [Nail Clubbing] : no clubbing of the fingernails [Cyanosis, Localized] : no localized cyanosis [Petechial Hemorrhages (___cm)] : no petechial hemorrhages [] : no ischemic changes

## 2019-08-25 NOTE — HISTORY OF PRESENT ILLNESS
[FreeTextEntry1] : Mr. Espino is a 74 year-old male with HTN, BPH, HLD, and REYNA (non-compliant with CPAP) who was recently evaluated at an OSH after an episode of near-syncope.  He notes the sudden onset of dizziness and light-headedness.  There was no associated chest pain, SOB, DURANT, nausea, diaphoresis, or LOC.  He was noted to have sinus bradycardia with pauses during sleep and was transferred to [facility] for further evaluation.  His symptoms were felt to be vasovagal along with nocturnal pauses associated with REYNA.  He was discharged with an event monitor and presents today to review the findings and discuss further management.  He denies any recurrent events since his discharge from the hospital.

## 2020-09-28 ENCOUNTER — OUTPATIENT (OUTPATIENT)
Dept: OUTPATIENT SERVICES | Facility: HOSPITAL | Age: 75
LOS: 1 days | End: 2020-09-28

## 2020-09-28 DIAGNOSIS — Z85.828 PERSONAL HISTORY OF OTHER MALIGNANT NEOPLASM OF SKIN: Chronic | ICD-10-CM

## 2020-09-28 DIAGNOSIS — Z98.890 OTHER SPECIFIED POSTPROCEDURAL STATES: Chronic | ICD-10-CM

## 2020-09-28 DIAGNOSIS — Z90.49 ACQUIRED ABSENCE OF OTHER SPECIFIED PARTS OF DIGESTIVE TRACT: Chronic | ICD-10-CM

## 2020-10-09 ENCOUNTER — TRANSCRIPTION ENCOUNTER (OUTPATIENT)
Age: 75
End: 2020-10-09

## 2021-03-19 ENCOUNTER — OUTPATIENT (OUTPATIENT)
Dept: OUTPATIENT SERVICES | Facility: HOSPITAL | Age: 76
LOS: 1 days | End: 2021-03-19

## 2021-03-19 DIAGNOSIS — Z98.890 OTHER SPECIFIED POSTPROCEDURAL STATES: Chronic | ICD-10-CM

## 2021-03-19 DIAGNOSIS — Z90.49 ACQUIRED ABSENCE OF OTHER SPECIFIED PARTS OF DIGESTIVE TRACT: Chronic | ICD-10-CM

## 2021-03-19 DIAGNOSIS — Z85.828 PERSONAL HISTORY OF OTHER MALIGNANT NEOPLASM OF SKIN: Chronic | ICD-10-CM

## 2021-04-02 ENCOUNTER — OUTPATIENT (OUTPATIENT)
Dept: OUTPATIENT SERVICES | Facility: HOSPITAL | Age: 76
LOS: 1 days | End: 2021-04-02

## 2021-04-02 DIAGNOSIS — Z85.828 PERSONAL HISTORY OF OTHER MALIGNANT NEOPLASM OF SKIN: Chronic | ICD-10-CM

## 2021-04-02 DIAGNOSIS — Z98.890 OTHER SPECIFIED POSTPROCEDURAL STATES: Chronic | ICD-10-CM

## 2021-04-02 DIAGNOSIS — Z90.49 ACQUIRED ABSENCE OF OTHER SPECIFIED PARTS OF DIGESTIVE TRACT: Chronic | ICD-10-CM

## 2021-04-15 ENCOUNTER — TRANSCRIPTION ENCOUNTER (OUTPATIENT)
Age: 76
End: 2021-04-15

## 2021-05-17 ENCOUNTER — OUTPATIENT (OUTPATIENT)
Dept: OUTPATIENT SERVICES | Facility: HOSPITAL | Age: 76
LOS: 1 days | End: 2021-05-17

## 2021-05-17 DIAGNOSIS — Z98.890 OTHER SPECIFIED POSTPROCEDURAL STATES: Chronic | ICD-10-CM

## 2021-05-17 DIAGNOSIS — Z85.828 PERSONAL HISTORY OF OTHER MALIGNANT NEOPLASM OF SKIN: Chronic | ICD-10-CM

## 2021-05-17 DIAGNOSIS — Z90.49 ACQUIRED ABSENCE OF OTHER SPECIFIED PARTS OF DIGESTIVE TRACT: Chronic | ICD-10-CM

## 2021-08-16 ENCOUNTER — OUTPATIENT (OUTPATIENT)
Dept: OUTPATIENT SERVICES | Facility: HOSPITAL | Age: 76
LOS: 1 days | End: 2021-08-16

## 2021-08-16 DIAGNOSIS — Z98.890 OTHER SPECIFIED POSTPROCEDURAL STATES: Chronic | ICD-10-CM

## 2021-08-16 DIAGNOSIS — Z85.828 PERSONAL HISTORY OF OTHER MALIGNANT NEOPLASM OF SKIN: Chronic | ICD-10-CM

## 2021-08-16 DIAGNOSIS — Z90.49 ACQUIRED ABSENCE OF OTHER SPECIFIED PARTS OF DIGESTIVE TRACT: Chronic | ICD-10-CM

## 2021-09-29 NOTE — DISCHARGE NOTE NURSING/CASE MANAGEMENT/SOCIAL WORK - NSDCVIVACCINE_GEN_ALL_CORE_FT
No Vaccines Administered. Z Plasty Text: The lesion was extirpated to the level of the fat with a #15 scalpel blade.  Given the location of the defect, shape of the defect and the proximity to free margins a Z-plasty was deemed most appropriate for repair.  Using a sterile surgical marker, the appropriate transposition arms of the Z-plasty were drawn incorporating the defect and placing the expected incisions within the relaxed skin tension lines where possible.    The area thus outlined was incised deep to adipose tissue with a #15 scalpel blade.  The skin margins were undermined to an appropriate distance in all directions utilizing iris scissors.  The opposing transposition arms were then transposed into place in opposite direction and anchored with interrupted buried subcutaneous sutures.

## 2021-11-21 NOTE — DISCHARGE NOTE NURSING/CASE MANAGEMENT/SOCIAL WORK - NSDCDPATPORTLINK_GEN_ALL_CORE
You can access the [facility] Patient Portal, offered by [facility], by registering with the following website: [email or web address]

## 2021-12-07 ENCOUNTER — OUTPATIENT (OUTPATIENT)
Dept: OUTPATIENT SERVICES | Facility: HOSPITAL | Age: 76
LOS: 1 days | End: 2021-12-07

## 2021-12-07 DIAGNOSIS — Z98.890 OTHER SPECIFIED POSTPROCEDURAL STATES: Chronic | ICD-10-CM

## 2021-12-07 DIAGNOSIS — Z85.828 PERSONAL HISTORY OF OTHER MALIGNANT NEOPLASM OF SKIN: Chronic | ICD-10-CM

## 2021-12-07 DIAGNOSIS — Z90.49 ACQUIRED ABSENCE OF OTHER SPECIFIED PARTS OF DIGESTIVE TRACT: Chronic | ICD-10-CM

## 2021-12-10 ENCOUNTER — EMERGENCY (EMERGENCY)
Facility: HOSPITAL | Age: 76
LOS: 1 days | End: 2021-12-10
Admitting: EMERGENCY MEDICINE
Payer: SELF-PAY

## 2021-12-10 DIAGNOSIS — Z90.49 ACQUIRED ABSENCE OF OTHER SPECIFIED PARTS OF DIGESTIVE TRACT: Chronic | ICD-10-CM

## 2021-12-10 DIAGNOSIS — Z98.890 OTHER SPECIFIED POSTPROCEDURAL STATES: Chronic | ICD-10-CM

## 2021-12-10 DIAGNOSIS — Z85.828 PERSONAL HISTORY OF OTHER MALIGNANT NEOPLASM OF SKIN: Chronic | ICD-10-CM

## 2021-12-10 PROCEDURE — L9991: CPT

## 2021-12-11 ENCOUNTER — EMERGENCY (EMERGENCY)
Facility: HOSPITAL | Age: 76
LOS: 1 days | End: 2021-12-11
Admitting: EMERGENCY MEDICINE
Payer: COMMERCIAL

## 2021-12-11 DIAGNOSIS — Z98.890 OTHER SPECIFIED POSTPROCEDURAL STATES: Chronic | ICD-10-CM

## 2021-12-11 DIAGNOSIS — Z90.49 ACQUIRED ABSENCE OF OTHER SPECIFIED PARTS OF DIGESTIVE TRACT: Chronic | ICD-10-CM

## 2021-12-11 DIAGNOSIS — Z85.828 PERSONAL HISTORY OF OTHER MALIGNANT NEOPLASM OF SKIN: Chronic | ICD-10-CM

## 2021-12-11 PROCEDURE — 99285 EMERGENCY DEPT VISIT HI MDM: CPT

## 2021-12-11 PROCEDURE — 71260 CT THORAX DX C+: CPT | Mod: 26

## 2021-12-11 PROCEDURE — 74177 CT ABD & PELVIS W/CONTRAST: CPT | Mod: 26

## 2021-12-11 PROCEDURE — 99053 MED SERV 10PM-8AM 24 HR FAC: CPT

## 2021-12-11 PROCEDURE — 70450 CT HEAD/BRAIN W/O DYE: CPT | Mod: 26

## 2021-12-11 PROCEDURE — 72125 CT NECK SPINE W/O DYE: CPT | Mod: 26

## 2021-12-14 PROBLEM — R00.1 BRADYCARDIA, UNSPECIFIED: Chronic | Status: ACTIVE | Noted: 2019-07-02

## 2021-12-14 PROBLEM — G47.33 OBSTRUCTIVE SLEEP APNEA (ADULT) (PEDIATRIC): Chronic | Status: ACTIVE | Noted: 2019-07-02

## 2021-12-14 PROBLEM — N20.0 CALCULUS OF KIDNEY: Chronic | Status: ACTIVE | Noted: 2019-07-02

## 2021-12-14 PROBLEM — C44.90 UNSPECIFIED MALIGNANT NEOPLASM OF SKIN, UNSPECIFIED: Chronic | Status: ACTIVE | Noted: 2019-07-02

## 2021-12-14 PROBLEM — E78.5 HYPERLIPIDEMIA, UNSPECIFIED: Chronic | Status: ACTIVE | Noted: 2019-07-02

## 2021-12-17 ENCOUNTER — APPOINTMENT (OUTPATIENT)
Dept: MRI IMAGING | Facility: CLINIC | Age: 76
End: 2021-12-17
Payer: MEDICARE

## 2021-12-17 PROCEDURE — 74183 MRI ABD W/O CNTR FLWD CNTR: CPT | Mod: MH

## 2021-12-17 PROCEDURE — A9585: CPT | Mod: JW

## 2022-03-11 ENCOUNTER — APPOINTMENT (OUTPATIENT)
Dept: NEUROSURGERY | Facility: CLINIC | Age: 77
End: 2022-03-11
Payer: COMMERCIAL

## 2022-03-11 VITALS
DIASTOLIC BLOOD PRESSURE: 80 MMHG | SYSTOLIC BLOOD PRESSURE: 128 MMHG | TEMPERATURE: 98.2 F | WEIGHT: 180 LBS | HEIGHT: 68 IN | BODY MASS INDEX: 27.28 KG/M2

## 2022-03-11 DIAGNOSIS — G89.29 LOW BACK PAIN, UNSPECIFIED: ICD-10-CM

## 2022-03-11 DIAGNOSIS — M48.061 SPINAL STENOSIS, LUMBAR REGION WITHOUT NEUROGENIC CLAUDICATION: ICD-10-CM

## 2022-03-11 DIAGNOSIS — M47.816 SPONDYLOSIS W/OUT MYELOPATHY OR RADICULOPATHY, LUMBAR REGION: ICD-10-CM

## 2022-03-11 DIAGNOSIS — M54.50 LOW BACK PAIN, UNSPECIFIED: ICD-10-CM

## 2022-03-11 PROCEDURE — 99204 OFFICE O/P NEW MOD 45 MIN: CPT

## 2022-03-11 PROCEDURE — 99072 ADDL SUPL MATRL&STAF TM PHE: CPT

## 2022-03-11 NOTE — DATA REVIEWED
[de-identified] :  Was reviewed of the lumbar spine (imaging plantation from [place]; disc returned to the patient) - 2/2022: Spinal stenosis with disc bulging noted at L3-4, L4-5, and L5-S1; lumbar spondylosis; mild to moderate foraminal narrowing at these levels

## 2022-03-11 NOTE — PHYSICAL EXAM
[General Appearance - Alert] : alert [General Appearance - In No Acute Distress] : in no acute distress [General Appearance - Well Nourished] : well nourished [General Appearance - Well Developed] : well developed [General Appearance - Well-Appearing] : healthy appearing [] : normal voice and communication [Oriented To Time, Place, And Person] : oriented to person, place, and time [Impaired Insight] : insight and judgment were intact [Affect] : the affect was normal [Mood] : the mood was normal [Memory Recent] : recent memory was not impaired [Memory Remote] : remote memory was not impaired [Person] : oriented to person [Place] : oriented to place [Time] : oriented to time [Motor Tone] : muscle tone was normal in all four extremities [Motor Strength] : muscle strength was normal in all four extremities [Involuntary Movements] : no involuntary movements were seen [No Muscle Atrophy] : normal bulk in all four extremities [5] : S1 ankle flexors 5/5 [Abnormal Walk] : normal gait [FreeTextEntry8] : Patient ambulates unassisted [Straight-Leg Raise Test - Left] : straight leg raise of the left leg was negative [Straight-Leg Raise Test - Right] : straight leg raise  of the right leg was negative

## 2022-03-11 NOTE — ASSESSMENT
[FreeTextEntry1] : Discussed the history, physical examination findings, and recent imaging with the patient with all questions answered.  After review of the imaging, conservative treatment recommended at this time with rest modified activity, anti-inflammatory medications as tolerated, and formal physical therapy.  Discussed that he does not need to wear the TLSO brace unless he is doing some prolonged activity requiring additional lumbar support.  Discussed that there is no role for neurosurgical intervention and at the patient will follow up on an as-needed basis.
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
## 2022-03-14 ENCOUNTER — NON-APPOINTMENT (OUTPATIENT)
Age: 77
End: 2022-03-14

## 2022-03-14 ENCOUNTER — APPOINTMENT (OUTPATIENT)
Dept: OPHTHALMOLOGY | Facility: CLINIC | Age: 77
End: 2022-03-14
Payer: COMMERCIAL

## 2022-03-14 PROCEDURE — 92004 COMPRE OPH EXAM NEW PT 1/>: CPT

## 2022-03-14 PROCEDURE — 99072 ADDL SUPL MATRL&STAF TM PHE: CPT

## 2022-04-21 ENCOUNTER — OUTPATIENT (OUTPATIENT)
Dept: OUTPATIENT SERVICES | Facility: HOSPITAL | Age: 77
LOS: 1 days | End: 2022-04-21
Payer: MEDICARE

## 2022-04-21 DIAGNOSIS — M47.812 SPONDYLOSIS WITHOUT MYELOPATHY OR RADICULOPATHY, CERVICAL REGION: ICD-10-CM

## 2022-04-21 DIAGNOSIS — M48.02 SPINAL STENOSIS, CERVICAL REGION: ICD-10-CM

## 2022-04-21 DIAGNOSIS — Z90.49 ACQUIRED ABSENCE OF OTHER SPECIFIED PARTS OF DIGESTIVE TRACT: Chronic | ICD-10-CM

## 2022-04-21 DIAGNOSIS — Z98.890 OTHER SPECIFIED POSTPROCEDURAL STATES: Chronic | ICD-10-CM

## 2022-04-21 DIAGNOSIS — Z85.828 PERSONAL HISTORY OF OTHER MALIGNANT NEOPLASM OF SKIN: Chronic | ICD-10-CM

## 2022-04-21 PROCEDURE — 72141 MRI NECK SPINE W/O DYE: CPT | Mod: 26,MH

## 2022-05-11 ENCOUNTER — OUTPATIENT (OUTPATIENT)
Dept: OUTPATIENT SERVICES | Facility: HOSPITAL | Age: 77
LOS: 1 days | End: 2022-05-11
Payer: MEDICARE

## 2022-05-11 DIAGNOSIS — Z85.828 PERSONAL HISTORY OF OTHER MALIGNANT NEOPLASM OF SKIN: Chronic | ICD-10-CM

## 2022-05-11 DIAGNOSIS — Z98.890 OTHER SPECIFIED POSTPROCEDURAL STATES: Chronic | ICD-10-CM

## 2022-05-11 DIAGNOSIS — M25.472 EFFUSION, LEFT ANKLE: ICD-10-CM

## 2022-05-11 DIAGNOSIS — K86.2 CYST OF PANCREAS: ICD-10-CM

## 2022-05-11 DIAGNOSIS — Z90.49 ACQUIRED ABSENCE OF OTHER SPECIFIED PARTS OF DIGESTIVE TRACT: Chronic | ICD-10-CM

## 2022-05-11 PROCEDURE — 73590 X-RAY EXAM OF LOWER LEG: CPT | Mod: 26,LT

## 2022-05-11 PROCEDURE — 74183 MRI ABD W/O CNTR FLWD CNTR: CPT | Mod: 26,MH

## 2022-05-11 PROCEDURE — 73600 X-RAY EXAM OF ANKLE: CPT | Mod: 26,LT

## 2022-05-14 ENCOUNTER — EMERGENCY (EMERGENCY)
Facility: HOSPITAL | Age: 77
LOS: 1 days | Discharge: HOME | End: 2022-05-14
Admitting: EMERGENCY MEDICINE
Payer: MEDICARE

## 2022-05-14 DIAGNOSIS — Z90.49 ACQUIRED ABSENCE OF OTHER SPECIFIED PARTS OF DIGESTIVE TRACT: Chronic | ICD-10-CM

## 2022-05-14 DIAGNOSIS — Z98.890 OTHER SPECIFIED POSTPROCEDURAL STATES: Chronic | ICD-10-CM

## 2022-05-14 DIAGNOSIS — Z85.828 PERSONAL HISTORY OF OTHER MALIGNANT NEOPLASM OF SKIN: Chronic | ICD-10-CM

## 2022-05-14 PROCEDURE — 99284 EMERGENCY DEPT VISIT MOD MDM: CPT | Mod: FS

## 2022-05-14 PROCEDURE — 93971 EXTREMITY STUDY: CPT | Mod: 26,LT

## 2022-05-14 PROCEDURE — 73590 X-RAY EXAM OF LOWER LEG: CPT | Mod: 26,LT

## 2022-05-17 DIAGNOSIS — R22.42 LOCALIZED SWELLING, MASS AND LUMP, LEFT LOWER LIMB: ICD-10-CM

## 2022-05-17 DIAGNOSIS — I10 ESSENTIAL (PRIMARY) HYPERTENSION: ICD-10-CM
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
## 2022-05-17 DIAGNOSIS — S80.12XA CONTUSION OF LEFT LOWER LEG, INITIAL ENCOUNTER: ICD-10-CM

## 2022-05-17 DIAGNOSIS — L03.116 CELLULITIS OF LEFT LOWER LIMB: ICD-10-CM
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
## 2022-05-25 ENCOUNTER — OUTPATIENT (OUTPATIENT)
Dept: OUTPATIENT SERVICES | Facility: HOSPITAL | Age: 77
LOS: 1 days | End: 2022-05-25
Payer: MEDICARE

## 2022-05-25 DIAGNOSIS — Z90.49 ACQUIRED ABSENCE OF OTHER SPECIFIED PARTS OF DIGESTIVE TRACT: Chronic | ICD-10-CM

## 2022-05-25 DIAGNOSIS — Z98.890 OTHER SPECIFIED POSTPROCEDURAL STATES: Chronic | ICD-10-CM

## 2022-05-25 DIAGNOSIS — Z85.828 PERSONAL HISTORY OF OTHER MALIGNANT NEOPLASM OF SKIN: Chronic | ICD-10-CM

## 2022-05-25 DIAGNOSIS — S80.12XD CONTUSION OF LEFT LOWER LEG, SUBSEQUENT ENCOUNTER: ICD-10-CM

## 2022-05-25 PROCEDURE — 73718 MRI LOWER EXTREMITY W/O DYE: CPT | Mod: 26,LT,MH

## 2022-10-13 ENCOUNTER — NON-APPOINTMENT (OUTPATIENT)
Age: 77
End: 2022-10-13

## 2022-10-13 ENCOUNTER — APPOINTMENT (OUTPATIENT)
Dept: OPHTHALMOLOGY | Facility: CLINIC | Age: 77
End: 2022-10-13

## 2022-10-13 PROCEDURE — 92014 COMPRE OPH EXAM EST PT 1/>: CPT

## 2023-12-07 ENCOUNTER — APPOINTMENT (OUTPATIENT)
Dept: OPHTHALMOLOGY | Facility: CLINIC | Age: 78
End: 2023-12-07
Payer: MEDICARE

## 2023-12-07 ENCOUNTER — NON-APPOINTMENT (OUTPATIENT)
Age: 78
End: 2023-12-07

## 2023-12-07 PROCEDURE — 99214 OFFICE O/P EST MOD 30 MIN: CPT

## 2024-04-08 ENCOUNTER — APPOINTMENT (OUTPATIENT)
Dept: OPHTHALMOLOGY | Facility: CLINIC | Age: 79
End: 2024-04-08

## 2024-04-22 ENCOUNTER — APPOINTMENT (OUTPATIENT)
Dept: OPHTHALMOLOGY | Facility: HOSPITAL | Age: 79
End: 2024-04-22

## 2024-04-23 ENCOUNTER — APPOINTMENT (OUTPATIENT)
Dept: OPHTHALMOLOGY | Facility: CLINIC | Age: 79
End: 2024-04-23

## 2024-04-29 ENCOUNTER — APPOINTMENT (OUTPATIENT)
Dept: MRI IMAGING | Facility: CLINIC | Age: 79
End: 2024-04-29
Payer: MEDICARE

## 2024-04-29 PROCEDURE — A9585: CPT

## 2024-04-29 PROCEDURE — 74183 MRI ABD W/O CNTR FLWD CNTR: CPT | Mod: MH

## 2024-05-02 ENCOUNTER — APPOINTMENT (OUTPATIENT)
Dept: OPHTHALMOLOGY | Facility: CLINIC | Age: 79
End: 2024-05-02

## 2024-05-23 ENCOUNTER — APPOINTMENT (OUTPATIENT)
Dept: OPHTHALMOLOGY | Facility: CLINIC | Age: 79
End: 2024-05-23

## 2024-08-12 ENCOUNTER — NON-APPOINTMENT (OUTPATIENT)
Age: 79
End: 2024-08-12

## 2024-08-12 ENCOUNTER — APPOINTMENT (OUTPATIENT)
Dept: OPHTHALMOLOGY | Facility: CLINIC | Age: 79
End: 2024-08-12
Payer: MEDICARE

## 2024-08-12 PROCEDURE — 92014 COMPRE OPH EXAM EST PT 1/>: CPT

## 2024-08-12 PROCEDURE — 92136 OPHTHALMIC BIOMETRY: CPT

## 2024-08-19 ENCOUNTER — APPOINTMENT (OUTPATIENT)
Dept: OPHTHALMOLOGY | Facility: HOSPITAL | Age: 79
End: 2024-08-19
Payer: MEDICARE

## 2024-08-19 ENCOUNTER — NON-APPOINTMENT (OUTPATIENT)
Age: 79
End: 2024-08-19

## 2024-08-19 PROCEDURE — 66982 XCAPSL CTRC RMVL CPLX WO ECP: CPT | Mod: LT

## 2024-08-20 ENCOUNTER — APPOINTMENT (OUTPATIENT)
Dept: OPHTHALMOLOGY | Facility: CLINIC | Age: 79
End: 2024-08-20
Payer: MEDICARE

## 2024-08-20 ENCOUNTER — NON-APPOINTMENT (OUTPATIENT)
Age: 79
End: 2024-08-20

## 2024-08-20 PROCEDURE — 99024 POSTOP FOLLOW-UP VISIT: CPT

## 2024-08-29 ENCOUNTER — NON-APPOINTMENT (OUTPATIENT)
Age: 79
End: 2024-08-29

## 2024-08-29 ENCOUNTER — APPOINTMENT (OUTPATIENT)
Dept: OPHTHALMOLOGY | Facility: CLINIC | Age: 79
End: 2024-08-29
Payer: MEDICARE

## 2024-08-29 PROCEDURE — 99024 POSTOP FOLLOW-UP VISIT: CPT

## 2024-09-11 ENCOUNTER — NON-APPOINTMENT (OUTPATIENT)
Age: 79
End: 2024-09-11

## 2024-09-11 ENCOUNTER — APPOINTMENT (OUTPATIENT)
Dept: OPHTHALMOLOGY | Facility: CLINIC | Age: 79
End: 2024-09-11
Payer: SELF-PAY

## 2024-09-11 ENCOUNTER — APPOINTMENT (OUTPATIENT)
Dept: OPHTHALMOLOGY | Facility: CLINIC | Age: 79
End: 2024-09-11
Payer: MEDICARE

## 2024-09-11 PROCEDURE — 92015 DETERMINE REFRACTIVE STATE: CPT

## 2024-09-11 PROCEDURE — 99024 POSTOP FOLLOW-UP VISIT: CPT

## 2024-09-23 ENCOUNTER — APPOINTMENT (OUTPATIENT)
Dept: OPHTHALMOLOGY | Facility: HOSPITAL | Age: 79
End: 2024-09-23

## 2024-09-24 ENCOUNTER — APPOINTMENT (OUTPATIENT)
Dept: OPHTHALMOLOGY | Facility: CLINIC | Age: 79
End: 2024-09-24

## 2024-09-25 ENCOUNTER — APPOINTMENT (OUTPATIENT)
Dept: OPHTHALMOLOGY | Facility: CLINIC | Age: 79
End: 2024-09-25
Payer: SELF-PAY

## 2024-09-25 ENCOUNTER — NON-APPOINTMENT (OUTPATIENT)
Age: 79
End: 2024-09-25

## 2024-09-25 PROCEDURE — 92015 DETERMINE REFRACTIVE STATE: CPT | Mod: NC

## 2024-09-26 ENCOUNTER — APPOINTMENT (OUTPATIENT)
Dept: OPHTHALMOLOGY | Facility: CLINIC | Age: 79
End: 2024-09-26

## 2024-10-10 ENCOUNTER — APPOINTMENT (OUTPATIENT)
Dept: OPHTHALMOLOGY | Facility: CLINIC | Age: 79
End: 2024-10-10

## 2024-10-24 ENCOUNTER — APPOINTMENT (OUTPATIENT)
Dept: OPHTHALMOLOGY | Facility: CLINIC | Age: 79
End: 2024-10-24

## 2024-11-25 ENCOUNTER — NON-APPOINTMENT (OUTPATIENT)
Age: 79
End: 2024-11-25

## 2024-11-25 ENCOUNTER — APPOINTMENT (OUTPATIENT)
Dept: OPHTHALMOLOGY | Facility: CLINIC | Age: 79
End: 2024-11-25
Payer: MEDICARE

## 2024-11-25 PROCEDURE — 92012 INTRM OPH EXAM EST PATIENT: CPT

## 2024-11-27 ENCOUNTER — APPOINTMENT (OUTPATIENT)
Dept: OPHTHALMOLOGY | Facility: CLINIC | Age: 79
End: 2024-11-27
Payer: SELF-PAY

## 2024-11-27 ENCOUNTER — NON-APPOINTMENT (OUTPATIENT)
Age: 79
End: 2024-11-27

## 2024-11-27 PROCEDURE — 92060 SENSORIMOTOR EXAMINATION: CPT

## 2024-12-12 ENCOUNTER — APPOINTMENT (OUTPATIENT)
Dept: OPHTHALMOLOGY | Facility: CLINIC | Age: 79
End: 2024-12-12

## 2025-04-17 ENCOUNTER — APPOINTMENT (OUTPATIENT)
Dept: MRI IMAGING | Facility: CLINIC | Age: 80
End: 2025-04-17
Payer: MEDICARE

## 2025-04-17 ENCOUNTER — RESULT REVIEW (OUTPATIENT)
Age: 80
End: 2025-04-17

## 2025-04-17 PROCEDURE — A9585: CPT

## 2025-04-17 PROCEDURE — 74183 MRI ABD W/O CNTR FLWD CNTR: CPT | Mod: 26

## 2025-04-28 ENCOUNTER — APPOINTMENT (OUTPATIENT)
Dept: OPHTHALMOLOGY | Facility: CLINIC | Age: 80
End: 2025-04-28
Payer: MEDICARE

## 2025-04-28 ENCOUNTER — NON-APPOINTMENT (OUTPATIENT)
Age: 80
End: 2025-04-28

## 2025-04-28 PROCEDURE — 92060 SENSORIMOTOR EXAMINATION: CPT

## 2025-04-28 PROCEDURE — 92015 DETERMINE REFRACTIVE STATE: CPT | Mod: NC

## 2025-05-02 ENCOUNTER — NON-APPOINTMENT (OUTPATIENT)
Age: 80
End: 2025-05-02

## 2025-08-13 ENCOUNTER — NON-APPOINTMENT (OUTPATIENT)
Age: 80
End: 2025-08-13

## 2025-08-14 ENCOUNTER — NON-APPOINTMENT (OUTPATIENT)
Age: 80
End: 2025-08-14

## 2025-08-14 ENCOUNTER — APPOINTMENT (OUTPATIENT)
Dept: OPHTHALMOLOGY | Facility: CLINIC | Age: 80
End: 2025-08-14
Payer: MEDICARE

## 2025-08-14 PROCEDURE — 99213 OFFICE O/P EST LOW 20 MIN: CPT
